# Patient Record
Sex: MALE | Race: WHITE | NOT HISPANIC OR LATINO | Employment: FULL TIME | URBAN - METROPOLITAN AREA
[De-identification: names, ages, dates, MRNs, and addresses within clinical notes are randomized per-mention and may not be internally consistent; named-entity substitution may affect disease eponyms.]

---

## 2017-11-01 ENCOUNTER — HOSPITAL ENCOUNTER (EMERGENCY)
Facility: HOSPITAL | Age: 62
Discharge: HOME | End: 2017-11-01
Attending: EMERGENCY MEDICINE
Payer: COMMERCIAL

## 2017-11-01 VITALS
SYSTOLIC BLOOD PRESSURE: 183 MMHG | TEMPERATURE: 98 F | RESPIRATION RATE: 18 BRPM | DIASTOLIC BLOOD PRESSURE: 77 MMHG | OXYGEN SATURATION: 97 % | HEART RATE: 85 BPM | HEIGHT: 67 IN | BODY MASS INDEX: 37.67 KG/M2 | WEIGHT: 240 LBS

## 2017-11-01 DIAGNOSIS — M25.531 RIGHT WRIST PAIN: ICD-10-CM

## 2017-11-01 DIAGNOSIS — S50.811A ABRASION OF RIGHT FOREARM, INITIAL ENCOUNTER: Primary | ICD-10-CM

## 2017-11-01 DIAGNOSIS — S59.911A INJURY OF RIGHT FOREARM, INITIAL ENCOUNTER: ICD-10-CM

## 2017-11-01 PROBLEM — S50.819A: Status: ACTIVE | Noted: 2017-11-01

## 2017-11-01 PROCEDURE — 99284 EMERGENCY DEPT VISIT MOD MDM: CPT

## 2017-11-01 PROCEDURE — 63600175 PHARM REV CODE 636 W HCPCS: Performed by: PHYSICIAN ASSISTANT

## 2017-11-01 PROCEDURE — 90715 TDAP VACCINE 7 YRS/> IM: CPT | Performed by: PHYSICIAN ASSISTANT

## 2017-11-01 PROCEDURE — 90471 IMMUNIZATION ADMIN: CPT | Performed by: PHYSICIAN ASSISTANT

## 2017-11-01 PROCEDURE — 25000003 PHARM REV CODE 250: Performed by: PHYSICIAN ASSISTANT

## 2017-11-01 RX ORDER — MUPIROCIN 20 MG/G
1 OINTMENT TOPICAL
Status: COMPLETED | OUTPATIENT
Start: 2017-11-01 | End: 2017-11-01

## 2017-11-01 RX ORDER — CEPHALEXIN 500 MG/1
500 CAPSULE ORAL EVERY 12 HOURS
Qty: 20 CAPSULE | Refills: 0 | Status: SHIPPED | OUTPATIENT
Start: 2017-11-01 | End: 2017-11-11

## 2017-11-01 RX ORDER — ACETAMINOPHEN 500 MG
500 TABLET ORAL
Status: COMPLETED | OUTPATIENT
Start: 2017-11-01 | End: 2017-11-01

## 2017-11-01 RX ORDER — MUPIROCIN 20 MG/G
OINTMENT TOPICAL 3 TIMES DAILY
Qty: 22 G | Refills: 0 | Status: SHIPPED | OUTPATIENT
Start: 2017-11-01 | End: 2017-11-08

## 2017-11-01 RX ORDER — ACETAMINOPHEN 500 MG
500 TABLET ORAL EVERY 6 HOURS PRN
Qty: 20 TABLET | Refills: 0 | Status: SHIPPED | OUTPATIENT
Start: 2017-11-01 | End: 2017-11-08

## 2017-11-01 RX ORDER — CEPHALEXIN 250 MG/1
500 CAPSULE ORAL
Status: COMPLETED | OUTPATIENT
Start: 2017-11-01 | End: 2017-11-01

## 2017-11-01 RX ADMIN — ACETAMINOPHEN 500 MG: 500 TABLET ORAL at 02:11

## 2017-11-01 RX ADMIN — CEPHALEXIN 500 MG: 250 CAPSULE ORAL at 02:11

## 2017-11-01 RX ADMIN — MUPIROCIN 22 G: 20 OINTMENT TOPICAL at 03:11

## 2017-11-01 RX ADMIN — CLOSTRIDIUM TETANI TOXOID ANTIGEN (FORMALDEHYDE INACTIVATED), CORYNEBACTERIUM DIPHTHERIAE TOXOID ANTIGEN (FORMALDEHYDE INACTIVATED), BORDETELLA PERTUSSIS TOXOID ANTIGEN (GLUTARALDEHYDE INACTIVATED), BORDETELLA PERTUSSIS FILAMENTOUS HEMAGGLUTININ ANTIGEN (FORMALDEHYDE INACTIVATED), BORDETELLA PERTUSSIS PERTACTIN ANTIGEN, AND BORDETELLA PERTUSSIS FIMBRIAE 2/3 ANTIGEN 0.5 ML: 5; 2; 2.5; 5; 3; 5 INJECTION, SUSPENSION INTRAMUSCULAR at 02:11

## 2017-11-01 NOTE — ED TRIAGE NOTES
Pt states he is an  moving an engine supports gave way and it landed on his right arm, sustained abrasions to the right arm and nose

## 2017-11-01 NOTE — DISCHARGE INSTRUCTIONS
Take full course of Keflex as prescribed to prevent infection of your wounds. Keep area clean, dry, apply Bactroban as prescribed and cover the area.     Follow up with primary care in 2 days.    Return to ER if you develop worsening symptoms, fever, worsening pain, redness, swelling, purulent drainage or as needed.

## 2017-11-01 NOTE — ED PROVIDER NOTES
Encounter Date: 11/1/2017    SCRIBE #1 NOTE: I, Gregorio Lamb, max scribing for, and in the presence of,  Syl Meraz PA-C. I have scribed the following portions of the note - Other sections scribed: HPI, ROS.       History     Chief Complaint   Patient presents with    Arm Injury     Was working on a car engine and it fell on his right arm.  States he can move his fingers     CC: Arm Injury    61 y/o male with Type II DM presents to the ED c/o acute onset lacerations to R forearm and tip of L sided nose that happened PTA. The pain is moderate (4/10). 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 The patient's tetanus is not UTD.The patient denies previous injury to his R forearm. The patient denies fever, back pain, neck pain, weakness, numbness/tingling, or N/V/D. No attempted treatment reported. No alleviating factors or other symptoms reported.      The history is provided by the patient. No  was used.     Review of patient's allergies indicates:  No Known Allergies  Past Medical History:   Diagnosis Date    Diabetes mellitus      Past Surgical History:   Procedure Laterality Date    APPENDECTOMY      VASECTOMY       History reviewed. No pertinent family history.  Social History   Substance Use Topics    Smoking status: Not on file    Smokeless tobacco: Not on file    Alcohol use Not on file     Review of Systems   Constitutional: Negative for chills and fever.   HENT: Negative for rhinorrhea.    Eyes: Negative for redness.   Respiratory: Negative for cough and shortness of breath.    Cardiovascular: Negative for chest pain.   Gastrointestinal: Negative for abdominal pain, diarrhea, nausea and vomiting.   Genitourinary: Negative for difficulty urinating and dysuria.   Musculoskeletal: Negative for back pain and neck pain.        (-) arm or leg trouble   Skin: Negative for rash.         (+) laceration to R forearm and tip of L sided nose   Neurological: Negative for weakness, numbness and headaches.       Physical Exam     Initial Vitals [11/01/17 1335]   BP Pulse Resp Temp SpO2   (!) 183/77 85 18 98.1 °F (36.7 °C) 97 %      MAP       112.33         Physical Exam    Nursing note and vitals reviewed.  Constitutional: He appears well-developed and well-nourished. No distress.   HENT:   Head: Normocephalic.   Small abrasion to tip of nose     Eyes: Conjunctivae are normal.   Cardiovascular: Normal rate and regular rhythm. Exam reveals no gallop and no friction rub.    No murmur heard.  Pulses:       Radial pulses are 2+ on the right side, and 2+ on the left side.   Pulmonary/Chest: Breath sounds normal. He has no wheezes. He has no rhonchi. He has no rales.   Musculoskeletal:   TTP to mid-shaft to distal radius. TTP over radial aspect of wrist with swelling   Neurological: He is alert. He has normal strength. No sensory deficit.   Skin: Skin is warm and dry.        Superficial abrasions to RUE (black 4 cm, yellow 2 cm, green 1 cm) and to tip of nose. Bleeding controlled         Psychiatric: He has a normal mood and affect.         ED Course   Procedures  Labs Reviewed - No data to display          Medical Decision Making:   Initial Assessment:   Pt is 62-year-old male who presents for evaluation of right forearm and right wrist pain and abrasions after dropping an aircraft engine on his right arm.  Patient is afebrile, well-appearing in no acute distress.  Patient's tetanus is not up to date.  Tetanus updated in ED.  Physical exam findings as detailed the above.  X-ray wrist was performed to include forearm and negative for any fractures or dislocations.  Wounds were cleaned and Bactroban applied. Does not appear to need lac repair. First dose of Keflex given. Patient is a diabetic so I will cover with Keflex as well to prevent infection.  PCP follow-up in 2 days.  I discussed this patient with   Mara who agrees with the assessment and plan.            Scribe Attestation:   Scribe #1: I performed the above scribed service and the documentation accurately describes the services I performed. I attest to the accuracy of the note.    Attending Attestation:           Physician Attestation for Scribe:  Physician Attestation Statement for Scribe #1: I, Syl Meraz PA-C, reviewed documentation, as scribed by Gregorio Lamb in my presence, and it is both accurate and complete.                 ED Course      Clinical Impression:   The primary encounter diagnosis was Abrasion of right forearm, initial encounter. Diagnoses of Right wrist pain and Injury of right forearm, initial encounter were also pertinent to this visit.                           Syl Beckham PA-C  11/01/17 7318

## 2018-11-19 ENCOUNTER — HOSPITAL ENCOUNTER (INPATIENT)
Facility: HOSPITAL | Age: 63
LOS: 2 days | Discharge: HOME | DRG: 378 | End: 2018-11-21
Attending: EMERGENCY MEDICINE | Admitting: HOSPITALIST
Payer: MEDICARE

## 2018-11-19 DIAGNOSIS — D61.818 PANCYTOPENIA: Primary | ICD-10-CM

## 2018-11-19 DIAGNOSIS — D64.9 SYMPTOMATIC ANEMIA: ICD-10-CM

## 2018-11-19 DIAGNOSIS — D64.9 ANEMIA: ICD-10-CM

## 2018-11-19 DIAGNOSIS — R42 DIZZINESS: ICD-10-CM

## 2018-11-19 PROBLEM — E11.9 TYPE 2 DIABETES MELLITUS: Chronic | Status: ACTIVE | Noted: 2018-11-19

## 2018-11-19 PROBLEM — E66.9 OBESITY, CLASS II, BMI 35-39.9: Chronic | Status: ACTIVE | Noted: 2018-11-19

## 2018-11-19 LAB
ABO + RH BLD: NORMAL
ALBUMIN SERPL BCP-MCNC: 3.3 G/DL
ALP SERPL-CCNC: 113 U/L
ALT SERPL W/O P-5'-P-CCNC: 26 U/L
AMPHET+METHAMPHET UR QL: NEGATIVE
ANION GAP SERPL CALC-SCNC: 10 MMOL/L
APTT BLDCRRT: 25.3 SEC
AST SERPL-CCNC: 30 U/L
BACTERIA #/AREA URNS HPF: NORMAL /HPF
BARBITURATES UR QL SCN>200 NG/ML: NEGATIVE
BASOPHILS # BLD AUTO: 0.02 K/UL
BASOPHILS NFR BLD: 0.8 %
BENZODIAZ UR QL SCN>200 NG/ML: NEGATIVE
BILIRUB SERPL-MCNC: 0.5 MG/DL
BILIRUB UR QL STRIP: NEGATIVE
BLD GP AB SCN CELLS X3 SERPL QL: NORMAL
BNP SERPL-MCNC: 115 PG/ML
BUN SERPL-MCNC: 36 MG/DL
BZE UR QL SCN: NEGATIVE
CALCIUM SERPL-MCNC: 8.4 MG/DL
CANNABINOIDS UR QL SCN: NEGATIVE
CHLORIDE SERPL-SCNC: 109 MMOL/L
CLARITY UR: CLEAR
CO2 SERPL-SCNC: 20 MMOL/L
COLOR UR: YELLOW
CREAT SERPL-MCNC: 2.1 MG/DL
CREAT UR-MCNC: 110.9 MG/DL
D DIMER PPP IA.FEU-MCNC: 1.26 MG/L FEU
DIFFERENTIAL METHOD: ABNORMAL
EOSINOPHIL # BLD AUTO: 0 K/UL
EOSINOPHIL NFR BLD: 1.5 %
ERYTHROCYTE [DISTWIDTH] IN BLOOD BY AUTOMATED COUNT: 15.4 %
EST. GFR  (AFRICAN AMERICAN): 37 ML/MIN/1.73 M^2
EST. GFR  (NON AFRICAN AMERICAN): 32 ML/MIN/1.73 M^2
ETHANOL SERPL-MCNC: <10 MG/DL
FLUAV AG SPEC QL IA: NEGATIVE
FLUBV AG SPEC QL IA: NEGATIVE
GLUCOSE SERPL-MCNC: 109 MG/DL
GLUCOSE SERPL-MCNC: 112 MG/DL (ref 70–110)
GLUCOSE UR QL STRIP: ABNORMAL
HCT VFR BLD AUTO: 20.1 %
HGB BLD-MCNC: 6.1 G/DL
HGB UR QL STRIP: NEGATIVE
INR PPP: 1
IRON SERPL-MCNC: 20 UG/DL
KETONES UR QL STRIP: NEGATIVE
LACTATE SERPL-SCNC: 1.6 MMOL/L
LACTATE SERPL-SCNC: 2.7 MMOL/L
LDH SERPL L TO P-CCNC: 199 U/L
LEUKOCYTE ESTERASE UR QL STRIP: NEGATIVE
LIPASE SERPL-CCNC: 106 U/L
LYMPHOCYTES # BLD AUTO: 0.3 K/UL
LYMPHOCYTES NFR BLD: 12.3 %
MAGNESIUM SERPL-MCNC: 2.1 MG/DL
MCH RBC QN AUTO: 28.5 PG
MCHC RBC AUTO-ENTMCNC: 30.3 G/DL
MCV RBC AUTO: 94 FL
METHADONE UR QL SCN>300 NG/ML: NEGATIVE
MICROSCOPIC COMMENT: NORMAL
MONOCYTES # BLD AUTO: 0.4 K/UL
MONOCYTES NFR BLD: 15.3 %
NEUTROPHILS # BLD AUTO: 1.8 K/UL
NEUTROPHILS NFR BLD: 70.1 %
NITRITE UR QL STRIP: NEGATIVE
OPIATES UR QL SCN: NORMAL
PCP UR QL SCN>25 NG/ML: NEGATIVE
PH UR STRIP: 5 [PH] (ref 5–8)
PLATELET # BLD AUTO: 66 K/UL
PMV BLD AUTO: 11.8 FL
POCT GLUCOSE: 112 MG/DL (ref 70–110)
POCT GLUCOSE: 221 MG/DL (ref 70–110)
POTASSIUM SERPL-SCNC: 4.5 MMOL/L
PROT SERPL-MCNC: 6.6 G/DL
PROT UR QL STRIP: NEGATIVE
PROTHROMBIN TIME: 10.9 SEC
RBC # BLD AUTO: 2.14 M/UL
RETICS/RBC NFR AUTO: 3.1 %
SATURATED IRON: 4 %
SODIUM SERPL-SCNC: 139 MMOL/L
SP GR UR STRIP: 1.01 (ref 1–1.03)
SPECIMEN SOURCE: NORMAL
TOTAL IRON BINDING CAPACITY: 518 UG/DL
TOXICOLOGY INFORMATION: NORMAL
TRANSFERRIN SERPL-MCNC: 350 MG/DL
TROPONIN I SERPL DL<=0.01 NG/ML-MCNC: <0.006 NG/ML
URATE SERPL-MCNC: 9.2 MG/DL
URN SPEC COLLECT METH UR: ABNORMAL
UROBILINOGEN UR STRIP-ACNC: NEGATIVE EU/DL
WBC # BLD AUTO: 2.61 K/UL
YEAST URNS QL MICRO: NORMAL

## 2018-11-19 PROCEDURE — 83540 ASSAY OF IRON: CPT

## 2018-11-19 PROCEDURE — 86901 BLOOD TYPING SEROLOGIC RH(D): CPT

## 2018-11-19 PROCEDURE — C9113 INJ PANTOPRAZOLE SODIUM, VIA: HCPCS | Performed by: EMERGENCY MEDICINE

## 2018-11-19 PROCEDURE — 85610 PROTHROMBIN TIME: CPT

## 2018-11-19 PROCEDURE — 87040 BLOOD CULTURE FOR BACTERIA: CPT | Mod: 59

## 2018-11-19 PROCEDURE — 83880 ASSAY OF NATRIURETIC PEPTIDE: CPT

## 2018-11-19 PROCEDURE — 96361 HYDRATE IV INFUSION ADD-ON: CPT

## 2018-11-19 PROCEDURE — 25000003 PHARM REV CODE 250: Performed by: EMERGENCY MEDICINE

## 2018-11-19 PROCEDURE — 87400 INFLUENZA A/B EACH AG IA: CPT | Mod: 59

## 2018-11-19 PROCEDURE — 21400001 HC TELEMETRY ROOM

## 2018-11-19 PROCEDURE — 83615 LACTATE (LD) (LDH) ENZYME: CPT

## 2018-11-19 PROCEDURE — 85379 FIBRIN DEGRADATION QUANT: CPT

## 2018-11-19 PROCEDURE — 82962 GLUCOSE BLOOD TEST: CPT

## 2018-11-19 PROCEDURE — 93010 ELECTROCARDIOGRAM REPORT: CPT | Mod: ,,, | Performed by: INTERNAL MEDICINE

## 2018-11-19 PROCEDURE — 83605 ASSAY OF LACTIC ACID: CPT | Mod: 91

## 2018-11-19 PROCEDURE — 80053 COMPREHEN METABOLIC PANEL: CPT

## 2018-11-19 PROCEDURE — P9021 RED BLOOD CELLS UNIT: HCPCS

## 2018-11-19 PROCEDURE — 36430 TRANSFUSION BLD/BLD COMPNT: CPT

## 2018-11-19 PROCEDURE — 85730 THROMBOPLASTIN TIME PARTIAL: CPT

## 2018-11-19 PROCEDURE — 85025 COMPLETE CBC W/AUTO DIFF WBC: CPT

## 2018-11-19 PROCEDURE — 96374 THER/PROPH/DIAG INJ IV PUSH: CPT

## 2018-11-19 PROCEDURE — 83735 ASSAY OF MAGNESIUM: CPT

## 2018-11-19 PROCEDURE — 93005 ELECTROCARDIOGRAM TRACING: CPT

## 2018-11-19 PROCEDURE — 86920 COMPATIBILITY TEST SPIN: CPT

## 2018-11-19 PROCEDURE — 80307 DRUG TEST PRSMV CHEM ANLYZR: CPT

## 2018-11-19 PROCEDURE — 85045 AUTOMATED RETICULOCYTE COUNT: CPT

## 2018-11-19 PROCEDURE — 81000 URINALYSIS NONAUTO W/SCOPE: CPT | Mod: 59

## 2018-11-19 PROCEDURE — 99285 EMERGENCY DEPT VISIT HI MDM: CPT | Mod: 25

## 2018-11-19 PROCEDURE — 84550 ASSAY OF BLOOD/URIC ACID: CPT

## 2018-11-19 PROCEDURE — 80320 DRUG SCREEN QUANTALCOHOLS: CPT

## 2018-11-19 PROCEDURE — 83690 ASSAY OF LIPASE: CPT

## 2018-11-19 PROCEDURE — 84484 ASSAY OF TROPONIN QUANT: CPT

## 2018-11-19 PROCEDURE — 63600175 PHARM REV CODE 636 W HCPCS: Performed by: EMERGENCY MEDICINE

## 2018-11-19 RX ORDER — IBUPROFEN 200 MG
24 TABLET ORAL
Status: DISCONTINUED | OUTPATIENT
Start: 2018-11-20 | End: 2018-11-21 | Stop reason: HOSPADM

## 2018-11-19 RX ORDER — HYDROCODONE BITARTRATE AND ACETAMINOPHEN 500; 5 MG/1; MG/1
TABLET ORAL
Status: DISCONTINUED | OUTPATIENT
Start: 2018-11-19 | End: 2018-11-21 | Stop reason: HOSPADM

## 2018-11-19 RX ORDER — GLUCAGON 1 MG
1 KIT INJECTION
Status: DISCONTINUED | OUTPATIENT
Start: 2018-11-20 | End: 2018-11-21 | Stop reason: HOSPADM

## 2018-11-19 RX ORDER — ACETAMINOPHEN 500 MG
500 TABLET ORAL EVERY 6 HOURS PRN
Status: DISCONTINUED | OUTPATIENT
Start: 2018-11-20 | End: 2018-11-21 | Stop reason: HOSPADM

## 2018-11-19 RX ORDER — INSULIN ASPART 100 [IU]/ML
0-5 INJECTION, SOLUTION INTRAVENOUS; SUBCUTANEOUS
Status: DISCONTINUED | OUTPATIENT
Start: 2018-11-20 | End: 2018-11-21 | Stop reason: HOSPADM

## 2018-11-19 RX ORDER — RAMELTEON 8 MG/1
8 TABLET ORAL NIGHTLY PRN
Status: DISCONTINUED | OUTPATIENT
Start: 2018-11-20 | End: 2018-11-21 | Stop reason: HOSPADM

## 2018-11-19 RX ORDER — PANTOPRAZOLE SODIUM 40 MG/10ML
80 INJECTION, POWDER, LYOPHILIZED, FOR SOLUTION INTRAVENOUS
Status: COMPLETED | OUTPATIENT
Start: 2018-11-19 | End: 2018-11-19

## 2018-11-19 RX ORDER — IBUPROFEN 200 MG
16 TABLET ORAL
Status: DISCONTINUED | OUTPATIENT
Start: 2018-11-20 | End: 2018-11-21 | Stop reason: HOSPADM

## 2018-11-19 RX ORDER — INSULIN ASPART 100 [IU]/ML
3 INJECTION, SOLUTION INTRAVENOUS; SUBCUTANEOUS
Status: DISCONTINUED | OUTPATIENT
Start: 2018-11-20 | End: 2018-11-21 | Stop reason: HOSPADM

## 2018-11-19 RX ORDER — ONDANSETRON 2 MG/ML
8 INJECTION INTRAMUSCULAR; INTRAVENOUS EVERY 8 HOURS PRN
Status: DISCONTINUED | OUTPATIENT
Start: 2018-11-20 | End: 2018-11-21 | Stop reason: HOSPADM

## 2018-11-19 RX ORDER — ACETAMINOPHEN 500 MG
1000 TABLET ORAL
Status: COMPLETED | OUTPATIENT
Start: 2018-11-19 | End: 2018-11-19

## 2018-11-19 RX ADMIN — SODIUM CHLORIDE 1000 ML: 9 INJECTION, SOLUTION INTRAVENOUS at 04:11

## 2018-11-19 RX ADMIN — SODIUM CHLORIDE: 0.9 INJECTION, SOLUTION INTRAVENOUS at 09:11

## 2018-11-19 RX ADMIN — ACETAMINOPHEN 1000 MG: 500 TABLET, FILM COATED ORAL at 02:11

## 2018-11-19 RX ADMIN — PANTOPRAZOLE SODIUM 80 MG: 40 INJECTION, POWDER, FOR SOLUTION INTRAVENOUS at 05:11

## 2018-11-19 RX ADMIN — SODIUM CHLORIDE 1000 ML: 9 INJECTION, SOLUTION INTRAVENOUS at 02:11

## 2018-11-19 NOTE — ED TRIAGE NOTES
Pt reports he came home from [facility] last week and feels like he has not caught up on his sleep. Pt has felt dizzy and off for week. Pt reports these symptoms have not stopped and he was told today he looked pale.

## 2018-11-19 NOTE — ED PROVIDER NOTES
"Encounter Date: 11/19/2018    SCRIBE #1 NOTE: I, Mason Maida, am scribing for, and in the presence of,  Promise Bennett MD. I have scribed the following portions of the note - Other sections scribed: HPI, ROS and PE.       History     Chief Complaint   Patient presents with    Dizziness     present to the ER with c/o " dizziness, blurry vision, cotton mouth, hard to breath if i start moving around" symptoms since " a few hours" Hx: DM     CC: Dizziness    HPI: This is a 63 y.o. male PMHx DM who presents with dizziness like feeling off-balance/weak/tired/dry mouth that began today at approximately 09:00. Also reports dry mouth. 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 He has not checked his CBG; last read was yesterday evening (180 mg/dL). He did recently travel to the [facility] (returned 1 week ago) but does not report any known mosquito bites/no swimming in rivers/stayed in the city. Denies falls, fever, cough, headaches. He took Tylenol last night but has not taken anything today. Denies dark or bloody stools. No history of heart problems reported. He did get his flu shot this year. Of note, he quit smoking 1 month ago and was recently dx'ed with bronchitis. No further symptoms.         The history is provided by the patient. No  was used.     Review of patient's allergies indicates:  No Known Allergies  Past Medical History:   Diagnosis Date    Diabetes mellitus      Past Surgical History:   Procedure Laterality Date    APPENDECTOMY      VASECTOMY       No family history on file.  Social History     Tobacco Use    Smoking status: Not on file   Substance Use Topics    Alcohol use: Not on file    Drug use: Not on file     Review of Systems   Constitutional: Negative.  Negative for chills, diaphoresis and fever.   HENT: Negative.  Negative for rhinorrhea and sore throat.    Eyes: Negative.  Negative for visual disturbance.   Respiratory: Negative.  " Negative for cough and shortness of breath.    Cardiovascular: Negative.  Negative for chest pain.   Gastrointestinal: Negative for abdominal pain, constipation, diarrhea, nausea and vomiting.   Endocrine:        Dry mouth   Genitourinary: Negative.  Negative for dysuria.   Musculoskeletal: Negative.    Allergic/Immunologic: Negative.    Neurological: Positive for dizziness. Negative for headaches.   Hematological: Negative.    Psychiatric/Behavioral: Negative.    All other systems reviewed and are negative.      Physical Exam     Initial Vitals [11/19/18 1415]   BP Pulse Resp Temp SpO2   127/60 80 20 99.1 °F (37.3 °C) 97 %      MAP       --         Physical Exam    Nursing note and vitals reviewed.  Constitutional: He appears well-developed and well-nourished. He is not diaphoretic. No distress.   HENT:   Head: Normocephalic and atraumatic.   Mouth/Throat: Oropharynx is clear and moist.   Eyes: Conjunctivae and EOM are normal. Pupils are equal, round, and reactive to light. No scleral icterus.   Neck: Normal range of motion. Neck supple. No JVD present.   Cardiovascular: Normal rate, regular rhythm and intact distal pulses.   Pulmonary/Chest: Breath sounds normal. No stridor. No respiratory distress.   Abdominal: Soft. Bowel sounds are normal. He exhibits no distension. There is no tenderness.   No ttp   Musculoskeletal: Normal range of motion. He exhibits no edema or tenderness.   Chronically mildly edematous ble w venous stasis; no oozing calf ttp or ulcer   Neurological: He is alert and oriented to person, place, and time. He has normal strength. No cranial nerve deficit.   Normal finger-nose.  Normal heel-to-shin.  Cranial nerves are intact. Normal strength all extremities. Normal voice.     Skin: Skin is warm and dry. No rash noted.   Psychiatric: He has a normal mood and affect.         ED Course   Procedures  Labs Reviewed   CBC W/ AUTO DIFFERENTIAL   COMPREHENSIVE METABOLIC PANEL   TROPONIN I   MAGNESIUM    POCT GLUCOSE MONITORING CONTINUOUS     EKG Readings: (Independently Interpreted)   Rhythm: Normal Sinus Rhythm. Ectopy: No Ectopy. Conduction: Normal. ST Segments: Normal ST Segments. T Waves: Normal. Clinical Impression: Normal Sinus Rhythm   nsr no stemi ; no old ekf for comparison; nl intervals       Imaging Results    None                     Scribe Attestation:   Scribe #1: I performed the above scribed service and the documentation accurately describes the services I performed. I attest to the accuracy of the note.    Attending Attestation:   Physician Attestation Statement for Resident:  As the supervising MD . -: Patient patient reports that since about 9:00 a.m. he has been having some element of dry mouth lightheaded desire to lay down.  There is no focal neurological complaints.  There is no focal neurological deficit on exam.  Normal finger-nose.  Normal heel-to-shin.  Cranial nerves are intact. Normal strength all extremities. Normal voice.  Patient blood pressure around 130s over 60s.  Previous visit within the past few weeks he has about 180s over 77.  Oral temp is 100°.  Will evaluate this patient and likely admit.  No STEMI on EKG.         Physician Attestation for Scribe:  Physician Attestation Statement for Scribe #1: I, Promise Bennett MD, reviewed documentation, as scribed by Mason Bey in my presence, and it is both accurate and complete.         Attending ED Notes:   Patient now reporting to RN that he was having some shortness of breath.  We will add a D-dimer.      Patient hemoglobin 6.  White blood cell count 2.6.  He is not neutropenic.  We will do rectal exam.  BUN creatinine 36 and 2.1.  Lactate slightly elevated.  Less likely due to sepsis.  D-dimer is elevated.  Patient does not report any shortness of breath to me.  Less likely pulmonary embolism.  Patient with some renal failure.  He does not report any chest pain shortness of breath to me.  Symptoms more likely due to anemia  pancytopenia.  We will transfuse this patient.  Protonix bolus and drip.  Will call GI.  I will admit this patient.  Lipase is elevated.  We will do bedside ultrasound.    Rectal exam shows brown stool guaiac negative. Rectal temp is 98.3°.  Ultrasound will be done bedside.  Patient consenting to transfusion.  I will talk to admitting physician.  Patient appears to be pancytopenic.  I will ask admitting physician if they would like me to continue Protonix and call GI.  He has never had endoscopy before.  He has  had colonoscopy 8-10 y ago    I spoke with Dr. raygoza.  Requests holding Protonix infusion and antibiotics. Less likely sepsis or pe more likely pancytopenai We will consult Hematology.  I have added iron and TIBC.  D-dimer likely elevated secondary to hematologic process and less likely pulmonary embolism. He req hold gi consult             Clinical Impression:   The encounter diagnosis was Dizziness.                             Promise Bennett MD  11/19/18 1455       Promise Bennett MD  11/19/18 1459       Promise Bennett MD  11/19/18 1551       Promise Bennett MD  11/19/18 1601       Promise Bennett MD  11/19/18 1601       Promise Bennett MD  11/19/18 1640       Promise Bennett MD  11/19/18 1715

## 2018-11-20 ENCOUNTER — ANESTHESIA EVENT (OUTPATIENT)
Dept: ENDOSCOPY | Facility: HOSPITAL | Age: 63
DRG: 378 | End: 2018-11-20
Payer: MEDICARE

## 2018-11-20 ENCOUNTER — ANESTHESIA (OUTPATIENT)
Dept: ENDOSCOPY | Facility: HOSPITAL | Age: 63
DRG: 378 | End: 2018-11-20
Payer: MEDICARE

## 2018-11-20 PROBLEM — D61.818 PANCYTOPENIA: Status: ACTIVE | Noted: 2018-11-19

## 2018-11-20 PROBLEM — N17.9 ARF (ACUTE RENAL FAILURE): Status: ACTIVE | Noted: 2018-11-20

## 2018-11-20 PROBLEM — E11.9 TYPE 2 DIABETES MELLITUS: Status: ACTIVE | Noted: 2018-11-19

## 2018-11-20 PROBLEM — E11.29 DM (DIABETES MELLITUS) TYPE II CONTROLLED WITH RENAL MANIFESTATION: Status: ACTIVE | Noted: 2018-11-19

## 2018-11-20 LAB
ALBUMIN SERPL BCP-MCNC: 3.3 G/DL
ALP SERPL-CCNC: 117 U/L
ALT SERPL W/O P-5'-P-CCNC: 29 U/L
ANION GAP SERPL CALC-SCNC: 9 MMOL/L
AST SERPL-CCNC: 29 U/L
BASOPHILS # BLD AUTO: 0.01 K/UL
BASOPHILS NFR BLD: 0.3 %
BILIRUB SERPL-MCNC: 2.2 MG/DL
BLD PROD TYP BPU: NORMAL
BLD PROD TYP BPU: NORMAL
BLOOD UNIT EXPIRATION DATE: NORMAL
BLOOD UNIT EXPIRATION DATE: NORMAL
BLOOD UNIT TYPE CODE: 6200
BLOOD UNIT TYPE CODE: 6200
BLOOD UNIT TYPE: NORMAL
BLOOD UNIT TYPE: NORMAL
BUN SERPL-MCNC: 30 MG/DL
CALCIUM SERPL-MCNC: 8.4 MG/DL
CHLORIDE SERPL-SCNC: 111 MMOL/L
CO2 SERPL-SCNC: 19 MMOL/L
CODING SYSTEM: NORMAL
CODING SYSTEM: NORMAL
CREAT SERPL-MCNC: 1.6 MG/DL
DIFFERENTIAL METHOD: ABNORMAL
DISPENSE STATUS: NORMAL
DISPENSE STATUS: NORMAL
EOSINOPHIL # BLD AUTO: 0.1 K/UL
EOSINOPHIL NFR BLD: 3.7 %
ERYTHROCYTE [DISTWIDTH] IN BLOOD BY AUTOMATED COUNT: 15.2 %
EST. GFR  (AFRICAN AMERICAN): 51 ML/MIN/1.73 M^2
EST. GFR  (NON AFRICAN AMERICAN): 45 ML/MIN/1.73 M^2
ESTIMATED AVG GLUCOSE: 154 MG/DL
GLUCOSE SERPL-MCNC: 125 MG/DL
HBA1C MFR BLD HPLC: 7 %
HCT VFR BLD AUTO: 26 %
HGB BLD-MCNC: 8.3 G/DL
HIV1+2 IGG SERPL QL IA.RAPID: NEGATIVE
LYMPHOCYTES # BLD AUTO: 0.4 K/UL
LYMPHOCYTES NFR BLD: 14.4 %
MAGNESIUM SERPL-MCNC: 2.2 MG/DL
MCH RBC QN AUTO: 29.6 PG
MCHC RBC AUTO-ENTMCNC: 31.9 G/DL
MCV RBC AUTO: 93 FL
MONOCYTES # BLD AUTO: 0.3 K/UL
MONOCYTES NFR BLD: 10.4 %
NEUTROPHILS # BLD AUTO: 2.1 K/UL
NEUTROPHILS NFR BLD: 71.2 %
PHOSPHATE SERPL-MCNC: 3.6 MG/DL
PLATELET # BLD AUTO: 55 K/UL
PMV BLD AUTO: 11.6 FL
POCT GLUCOSE: 138 MG/DL (ref 70–110)
POCT GLUCOSE: 203 MG/DL (ref 70–110)
POCT GLUCOSE: 210 MG/DL (ref 70–110)
POTASSIUM SERPL-SCNC: 5.2 MMOL/L
PROT SERPL-MCNC: 6.9 G/DL
RBC # BLD AUTO: 2.8 M/UL
SODIUM SERPL-SCNC: 139 MMOL/L
TRANS ERYTHROCYTES VOL PATIENT: NORMAL ML
TRANS ERYTHROCYTES VOL PATIENT: NORMAL ML
WBC # BLD AUTO: 2.98 K/UL

## 2018-11-20 PROCEDURE — 25000003 PHARM REV CODE 250: Performed by: INTERNAL MEDICINE

## 2018-11-20 PROCEDURE — 43244 EGD VARICES LIGATION: CPT | Performed by: INTERNAL MEDICINE

## 2018-11-20 PROCEDURE — 88305 TISSUE EXAM BY PATHOLOGIST: CPT | Performed by: PATHOLOGY

## 2018-11-20 PROCEDURE — 37000008 HC ANESTHESIA 1ST 15 MINUTES: Performed by: INTERNAL MEDICINE

## 2018-11-20 PROCEDURE — 84100 ASSAY OF PHOSPHORUS: CPT

## 2018-11-20 PROCEDURE — 80053 COMPREHEN METABOLIC PANEL: CPT

## 2018-11-20 PROCEDURE — 83735 ASSAY OF MAGNESIUM: CPT

## 2018-11-20 PROCEDURE — 25000003 PHARM REV CODE 250: Performed by: ANESTHESIOLOGY

## 2018-11-20 PROCEDURE — 36415 COLL VENOUS BLD VENIPUNCTURE: CPT

## 2018-11-20 PROCEDURE — 63600175 PHARM REV CODE 636 W HCPCS: Performed by: INTERNAL MEDICINE

## 2018-11-20 PROCEDURE — 83036 HEMOGLOBIN GLYCOSYLATED A1C: CPT

## 2018-11-20 PROCEDURE — S5571 INSULIN DISPOS PEN 3 ML: HCPCS | Performed by: INTERNAL MEDICINE

## 2018-11-20 PROCEDURE — 80074 ACUTE HEPATITIS PANEL: CPT

## 2018-11-20 PROCEDURE — 63600175 PHARM REV CODE 636 W HCPCS: Performed by: NURSE ANESTHETIST, CERTIFIED REGISTERED

## 2018-11-20 PROCEDURE — 85025 COMPLETE CBC W/AUTO DIFF WBC: CPT

## 2018-11-20 PROCEDURE — 37000009 HC ANESTHESIA EA ADD 15 MINS: Performed by: INTERNAL MEDICINE

## 2018-11-20 PROCEDURE — D9220A PRA ANESTHESIA: Mod: CRNA,,, | Performed by: NURSE ANESTHETIST, CERTIFIED REGISTERED

## 2018-11-20 PROCEDURE — 43239 EGD BIOPSY SINGLE/MULTIPLE: CPT | Performed by: INTERNAL MEDICINE

## 2018-11-20 PROCEDURE — 27201012 HC FORCEPS, HOT/COLD, DISP: Performed by: INTERNAL MEDICINE

## 2018-11-20 PROCEDURE — 25000003 PHARM REV CODE 250: Performed by: HOSPITALIST

## 2018-11-20 PROCEDURE — 0DB98ZX EXCISION OF DUODENUM, VIA NATURAL OR ARTIFICIAL OPENING ENDOSCOPIC, DIAGNOSTIC: ICD-10-PCS | Performed by: INTERNAL MEDICINE

## 2018-11-20 PROCEDURE — 21400001 HC TELEMETRY ROOM

## 2018-11-20 PROCEDURE — 27201022: Performed by: INTERNAL MEDICINE

## 2018-11-20 PROCEDURE — P9021 RED BLOOD CELLS UNIT: HCPCS

## 2018-11-20 PROCEDURE — 06L38CZ OCCLUSION OF ESOPHAGEAL VEIN WITH EXTRALUMINAL DEVICE, VIA NATURAL OR ARTIFICIAL OPENING ENDOSCOPIC: ICD-10-PCS | Performed by: INTERNAL MEDICINE

## 2018-11-20 PROCEDURE — 0DB68ZX EXCISION OF STOMACH, VIA NATURAL OR ARTIFICIAL OPENING ENDOSCOPIC, DIAGNOSTIC: ICD-10-PCS | Performed by: INTERNAL MEDICINE

## 2018-11-20 PROCEDURE — 88305 TISSUE EXAM BY PATHOLOGIST: CPT | Mod: 26,,, | Performed by: PATHOLOGY

## 2018-11-20 PROCEDURE — 86703 HIV-1/HIV-2 1 RESULT ANTBDY: CPT

## 2018-11-20 PROCEDURE — D9220A PRA ANESTHESIA: Mod: ANES,,, | Performed by: ANESTHESIOLOGY

## 2018-11-20 PROCEDURE — 36430 TRANSFUSION BLD/BLD COMPNT: CPT

## 2018-11-20 RX ORDER — INSULIN LISPRO 100 [IU]/ML
32 INJECTION, SOLUTION INTRAVENOUS; SUBCUTANEOUS 2 TIMES DAILY
Status: ON HOLD | COMMUNITY
End: 2018-11-21 | Stop reason: SDUPTHER

## 2018-11-20 RX ORDER — SODIUM CHLORIDE 0.9 % (FLUSH) 0.9 %
3 SYRINGE (ML) INJECTION
Status: DISCONTINUED | OUTPATIENT
Start: 2018-11-20 | End: 2018-11-20 | Stop reason: HOSPADM

## 2018-11-20 RX ORDER — EMPAGLIFLOZIN AND METFORMIN HYDROCHLORIDE 12.5; 1 MG/1; MG/1
2 TABLET ORAL 2 TIMES DAILY
COMMUNITY

## 2018-11-20 RX ORDER — PROPOFOL 10 MG/ML
VIAL (ML) INTRAVENOUS
Status: DISPENSED
Start: 2018-11-20 | End: 2018-11-21

## 2018-11-20 RX ORDER — PROPOFOL 10 MG/ML
VIAL (ML) INTRAVENOUS
Status: DISCONTINUED | OUTPATIENT
Start: 2018-11-20 | End: 2018-11-20

## 2018-11-20 RX ORDER — LIDOCAINE HYDROCHLORIDE 20 MG/ML
INJECTION, SOLUTION INFILTRATION; PERINEURAL
Status: DISPENSED
Start: 2018-11-20 | End: 2018-11-21

## 2018-11-20 RX ORDER — SODIUM CHLORIDE 9 MG/ML
INJECTION, SOLUTION INTRAVENOUS CONTINUOUS
Status: DISCONTINUED | OUTPATIENT
Start: 2018-11-20 | End: 2018-11-21 | Stop reason: HOSPADM

## 2018-11-20 RX ORDER — INSULIN GLARGINE 100 [IU]/ML
80 INJECTION, SOLUTION SUBCUTANEOUS NIGHTLY
Status: ON HOLD | COMMUNITY
End: 2018-11-21 | Stop reason: SDUPTHER

## 2018-11-20 RX ORDER — LIDOCAINE HCL/PF 100 MG/5ML
SYRINGE (ML) INTRAVENOUS
Status: DISCONTINUED | OUTPATIENT
Start: 2018-11-20 | End: 2018-11-20

## 2018-11-20 RX ORDER — TELMISARTAN AND HYDROCHLORTHIAZIDE 80; 25 MG/1; MG/1
1 TABLET ORAL DAILY
Status: ON HOLD | COMMUNITY
End: 2018-11-21 | Stop reason: HOSPADM

## 2018-11-20 RX ORDER — ATORVASTATIN CALCIUM 10 MG/1
10 TABLET, FILM COATED ORAL DAILY
COMMUNITY

## 2018-11-20 RX ORDER — SODIUM CHLORIDE 450 MG/100ML
INJECTION, SOLUTION INTRAVENOUS CONTINUOUS
Status: DISCONTINUED | OUTPATIENT
Start: 2018-11-20 | End: 2018-11-21 | Stop reason: HOSPADM

## 2018-11-20 RX ADMIN — PROPOFOL 20 MG: 10 INJECTION, EMULSION INTRAVENOUS at 12:11

## 2018-11-20 RX ADMIN — SODIUM CHLORIDE: 0.45 INJECTION, SOLUTION INTRAVENOUS at 01:11

## 2018-11-20 RX ADMIN — PROPOFOL 40 MG: 10 INJECTION, EMULSION INTRAVENOUS at 12:11

## 2018-11-20 RX ADMIN — INSULIN DETEMIR 10 UNITS: 100 INJECTION, SOLUTION SUBCUTANEOUS at 09:11

## 2018-11-20 RX ADMIN — INSULIN ASPART 3 UNITS: 100 INJECTION, SOLUTION INTRAVENOUS; SUBCUTANEOUS at 05:11

## 2018-11-20 RX ADMIN — CEFTRIAXONE 1 G: 1 INJECTION, SOLUTION INTRAVENOUS at 01:11

## 2018-11-20 RX ADMIN — INSULIN ASPART 2 UNITS: 100 INJECTION, SOLUTION INTRAVENOUS; SUBCUTANEOUS at 05:11

## 2018-11-20 RX ADMIN — OCTREOTIDE ACETATE 50 MCG/HR: 200 INJECTION, SOLUTION INTRAVENOUS; SUBCUTANEOUS at 03:11

## 2018-11-20 RX ADMIN — SODIUM CHLORIDE: 0.9 INJECTION, SOLUTION INTRAVENOUS at 11:11

## 2018-11-20 RX ADMIN — PROPOFOL 80 MG: 10 INJECTION, EMULSION INTRAVENOUS at 12:11

## 2018-11-20 RX ADMIN — LIDOCAINE HYDROCHLORIDE 100 MG: 20 INJECTION, SOLUTION INTRAVENOUS at 12:11

## 2018-11-20 RX ADMIN — INSULIN ASPART 1 UNITS: 100 INJECTION, SOLUTION INTRAVENOUS; SUBCUTANEOUS at 09:11

## 2018-11-20 NOTE — PROGRESS NOTES
[facility] Hospital Medicine  Progress Note    Patient Name: Leon Lee  MRN: [id number]  Patient Class: IP- Inpatient   Admission Date: 11/19/2018  Length of Stay: 1 days  Attending Physician: Kellie Peres MD  Primary Care Provider: Cale Ortiz MD        Subjective:     Principal Problem:Pancytopenia    HPI:  Mr. Leon Lee is a 63 y.o. male with type 2 diabetes mellitus (HbA1c unknown) and obesity (BMI 37.5) who presents to [facility] ED with complaints of weakness today.  He also had some dry mouth.  The weakness has been present for the past week or so but has gotten significant worse today while at work.  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  He also felt lightheaded on postural changes but denies any falls or loss of consciousness.  He denies any shortness of breath, chest pains, nor any palpitations.  He did feel similar symptoms for two days toward the end of last week but he just tried to ignore it.  He does say that he spent 9 days in the [facility] with his wife for an emergency and returned here about a week ago.  He has been jetlagged and says he just can't get back into the rhythm of things.  His appetite has been good and he has been compliant with his medications.  He denies any changes to his medications.    Of note, he was found to be anemic in the ED of which he denies a history.  He denies any hematochezia, melena, hematuria, hemoptysis, hematemesis, nor any coffee-ground emesis.  He denies any NSAID use and did have a colonoscopy at [facility] greater than 10 years ago which he says is clear.      Hospital Course:  Mr. Leon Lee is a 63 y.o. male with type 2 diabetes mellitus (HbA1c unknown) and obesity (BMI 37.5) who presents to [facility] ED with complaints of weakness.  He also had some dry mouth.  The weakness has been present for the past  week or so but has gotten significant worse today while at work.  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  He also felt lightheaded on postural changes but denies any falls or loss of consciousness.  He denies any shortness of breath, chest pains, nor any palpitations.  He did feel similar symptoms for two days toward the end of last week but he just tried to ignore it.  He does say that he spent 9 days in the [facility] with his wife for an emergency and returned here about a week ago.  He has been jetlagged and says he just can't get back into the rhythm of things.  His appetite has been good and he has been compliant with his medications.  He denies any changes to his medications.  Of note, he was found to be anemic in the ED of which he denies a history.  He denies any hematochezia, melena, hematuria, hemoptysis, hematemesis, nor any coffee-ground emesis.  He denies any NSAID use and did have a colonoscopy at [facility] greater than 10 years ago which he says is clear.    Patient is pancytopenic,S/P 2 PRBC with improvement in HH,,  GI has been consulted and planing doing EGD.  Will check hepatitis panel and rapid HIV test.consult hematology.  Started on IVF for ARF.      Past Medical History:   Diagnosis Date    Anemia 11/19/2018    Bronchitis     October/November 2018    Cigarette smoker 11/19/2018    reports quiting in early October, 2018    Depression 11/19/2018    grandson recently killed in motorcycle accident    Diabetes mellitus     Hypertension     Obese        Past Surgical History:   Procedure Laterality Date    APPENDECTOMY      VASECTOMY         Review of patient's allergies indicates:   Allergen Reactions    Trulicity [dulaglutide] Itching       No current facility-administered medications on file prior to encounter.      Current Outpatient Medications on File Prior to  Encounter   Medication Sig    UNKNOWN TO PATIENT      Family History     None        Tobacco Use    Smoking status: Former Smoker     Types: Cigarettes     Last attempt to quit: 10/19/2018     Years since quittin.0   Substance and Sexual Activity    Alcohol use: No     Frequency: Never    Drug use: No    Sexual activity: Not on file     Review of Systems   Constitutional: Positive for activity change and fatigue. Negative for appetite change, chills, diaphoresis, fever and unexpected weight change.   HENT: Negative.    Eyes: Negative.    Respiratory: Negative for cough, chest tightness, shortness of breath and wheezing.    Cardiovascular: Positive for leg swelling. Negative for chest pain and palpitations.   Gastrointestinal: Negative for abdominal distention, abdominal pain, blood in stool, constipation, diarrhea, nausea and vomiting.   Genitourinary: Negative for dysuria and hematuria.   Musculoskeletal: Negative.    Skin: Negative.    Neurological: Positive for weakness and light-headedness. Negative for dizziness, seizures and syncope.   Psychiatric/Behavioral: Negative.      Objective:     Vital Signs (Most Recent):  Temp: 98.4 °F (36.9 °C) (18 07)  Pulse: 73 (18 07)  Resp: 20 (18)  BP: (!) 140/65 (18)  SpO2: 96 % (18) Vital Signs (24h Range):  Temp:  [97.7 °F (36.5 °C)-99.6 °F (37.6 °C)] 98.4 °F (36.9 °C)  Pulse:  [66-82] 73  Resp:  [16-25] 20  SpO2:  [95 %-98 %] 96 %  BP: (113-140)/(54-65) 140/65     Weight: 108.3 kg (238 lb 12.1 oz)  Body mass index is 37.39 kg/m².    Physical Exam   Constitutional: He is oriented to person, place, and time. He appears well-developed and well-nourished. No distress.   HENT:   Head: Normocephalic and atraumatic.   Right Ear: External ear normal.   Left Ear: External ear normal.   Nose: Nose normal.   Eyes: Right eye exhibits no discharge. Left eye exhibits no discharge.   Neck: Normal range of motion.   Cardiovascular:    Regular rate and rhythm with a Grade II/VI early systolic murmur, no gallops   Pulmonary/Chest: Effort normal and breath sounds normal. No stridor. No respiratory distress. He has no wheezes. He has no rales. He exhibits no tenderness.   Abdominal: Soft. Bowel sounds are normal. He exhibits no distension. There is no tenderness. There is no rebound and no guarding.   Musculoskeletal: Normal range of motion. He exhibits no edema.   Neurological: He is alert and oriented to person, place, and time.   Skin: Skin is warm and dry. He is not diaphoretic. No erythema.   Psychiatric: He has a normal mood and affect. His behavior is normal. Judgment and thought content normal.   Nursing note and vitals reviewed.          Significant Labs: All pertinent labs within the past 24 hours have been reviewed.    Significant Imaging: I have reviewed and interpreted all pertinent imaging results/findings within the past 24 hours.    Assessment/Plan:      * Pancytopenia    We do not have previous labwork to which to compare the chronicity of his anemia but given his acute onset symptoms, I'm assuming this is new.  He has had recent travel but it doesn't appear to be related given that he has a relatively normal differential on his CBC and no increased LDH or total bilirubin.  An anemia panel reveals low iron and high TIBC which suggests iron deficiency but he has a normal MCV.  Patient is pancytopenic,S/P 2 PRBC with improvement in HH,,  GI has been consulted and planing doing EGD.  Will check hepatitis panel and rapid HIV test.consult hematology.       ARF (acute renal failure)    Started on IVF,not sure how acute is.       Obesity, Class II, BMI 35-39.9    The patient has been counseled on the negative impact that obesity imparts on his health and was encouraged to make lifestyle changes in order to lose weight and decrease his modifiable risk factors.     DM (diabetes mellitus) type II controlled with renal manifestation     Unclear what medications he takes but will provide basal-prandial insulin therapy along with insulin sliding scale.       VTE Risk Mitigation (From admission, onward)    None              Kellie Peres MD  Department of Hospital Medicine   [facility]

## 2018-11-20 NOTE — PROGRESS NOTES
Report received from off going nurse, CHARITY Rainey. Patient AAO. No signs of distress noted. Call light in reach. Bed low and locked. Will continue to monitor.
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
## 2018-11-20 NOTE — HPI
Mr. Leon Lee is a 63 y.o. male with type 2 diabetes mellitus (HbA1c unknown) and obesity (BMI 37.5) who presents to [facility] ED with complaints of weakness today.  He also had some dry mouth.  The weakness has been present for the past week or so but has gotten significant worse today while at work.  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  He also felt lightheaded on postural changes but denies any falls or loss of consciousness.  He denies any shortness of breath, chest pains, nor any palpitations.  He did feel similar symptoms for two days toward the end of last week but he just tried to ignore it.  He does say that he spent 9 days in the [facility] with his wife for an emergency and returned here about a week ago.  He has been jetlagged and says he just can't get back into the rhythm of things.  His appetite has been good and he has been compliant with his medications.  He denies any changes to his medications.    Of note, he was found to be anemic in the ED of which he denies a history.  He denies any hematochezia, melena, hematuria, hemoptysis, hematemesis, nor any coffee-ground emesis.  He denies any NSAID use and did have a colonoscopy at [facility] greater than 10 years ago which he says is clear.

## 2018-11-20 NOTE — PLAN OF CARE
Problem: Diabetes, Type 2 (Adult)  Intervention: Optimize Glycemic Control   11/20/18 0057   Nutrition Interventions   Glycemic Management blood glucose monitoring

## 2018-11-20 NOTE — PROGRESS NOTES
DINORAH contacted [facility] @ [phone] to schedule GI follow-up, DINORAH spoke to Ondina. Patient will see Dr. Martinez on 12/17/18 @ 8:45am. DINORAH contacted Dr. Ortiz office @ [phone] to schedule PCP follow-up, DINORAH spoke to Aline, appointment scheduled for 11/26/18 @ 8:45am.

## 2018-11-20 NOTE — PLAN OF CARE
Problem: Fall Risk (Adult)  Intervention: Safety Promotion/Fall Prevention   11/19/18 4570   Safety Interventions   Safety Promotion/Fall Prevention assistive device/personal item within reach;bed alarm set;diversional activities provided;Fall Risk reviewed with patient/family;lighting adjusted;medications reviewed;nonskid shoes/socks when out of bed;room near unit station;side rails raised x 2;instructed to call staff for mobility
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
## 2018-11-20 NOTE — DISCHARGE SUMMARY
[facility]  Discharge Summary      Admit Date: 11/19/2018    Discharge Date and Time:  11/20/2018 12:39 PM    Attending Physician: Kellie Peres MD     Reason for Admission: GI Bleed    Procedures Performed: Procedure(s) (LRB):  EGD (ESOPHAGOGASTRODUODENOSCOPY) (Left)    Hospital Course (synopsis of major diagnoses, care, treatment, and services provided during the course of the hospital stay): Ongoing     Consults: GI    Significant Diagnostic Studies: EGD    Final Diagnoses:    Principal Problem: Pancytopenia   Secondary Diagnoses: Varices    Discharged Condition: good    Disposition: Admitted as an Inpatient    Follow Up/Patient Instructions: Follow-up with referring physician             Resume previous diet and activity.    Medications:  Transfer Medications (for Discharge Readmit only):   Current Facility-Administered Medications   Medication Dose Route Frequency Provider Last Rate Last Dose    0.45% NaCl infusion   Intravenous Continuous Kellie Peres MD        0.9%  NaCl infusion (for blood administration)   Intravenous Q24H PRN Promise Bennett MD        0.9%  NaCl infusion   Intravenous Continuous Josue Hutton MD 50 mL/hr at 11/20/18 1146      acetaminophen tablet 500 mg  500 mg Oral Q6H PRN Niurka Lamb MD        dextrose 50% injection 12.5 g  12.5 g Intravenous PRN Niurka Lamb MD        dextrose 50% injection 25 g  25 g Intravenous PRN Niurka Lamb MD        glucagon (human recombinant) injection 1 mg  1 mg Intramuscular PRN Niurka Lamb MD        glucose chewable tablet 16 g  16 g Oral PRN Niurka Lamb MD        glucose chewable tablet 24 g  24 g Oral PRN Niurka Lamb MD        insulin aspart U-100 pen 0-5 Units  0-5 Units Subcutaneous PRN Niurka Lamb MD        insulin aspart U-100 pen 3 Units  3 Units Subcutaneous TIDWM Niurka Lamb MD        insulin detemir U-100 pen 10 Units  10 Units Subcutaneous QHS Niurka Lamb MD        ondansetron  injection 8 mg  8 mg Intravenous Q8H PRN Niurka Lamb MD        promethazine (PHENERGAN) 6.25 mg in dextrose 5 % 50 mL IVPB  6.25 mg Intravenous Q6H PRN Niruka Lamb MD        ramelteon tablet 8 mg  8 mg Oral Nightly PRN Niurka Lamb MD        sodium chloride 0.9% flush 3 mL  3 mL Intravenous PRN Blu Martinez MD         Facility-Administered Medications Ordered in Other Encounters   Medication Dose Route Frequency Provider Last Rate Last Dose    lidocaine (cardiac) injection    PRN Natalie C. Belair, CRNA   100 mg at 11/20/18 1222    propofol (DIPRIVAN) 10 mg/mL infusion    PRN Natalie C. Belair, CRNA   20 mg at 11/20/18 1233     No discharge procedures on file.

## 2018-11-20 NOTE — ASSESSMENT & PLAN NOTE
Unclear what medications he takes but will provide basal-prandial insulin therapy along with insulin sliding scale.

## 2018-11-20 NOTE — HOSPITAL COURSE
Mr. Leon Lee is a 63 y.o. male with type 2 diabetes mellitus (HbA1c unknown) and obesity (BMI 37.5) who presents to [facility] ED with complaints of weakness.  He also had some dry mouth.  The weakness has been present for the past week or so but has gotten significant worse  while at work.  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  He also felt lightheaded on postural changes but denies any falls or loss of consciousness.  He denies any shortness of breath, chest pains, nor any palpitations.  He did feel similar symptoms for two days toward the end of last week but he just tried to ignore it.  He does say that he spent 9 days in the [facility] with his wife for an emergency and returned here about a week ago.  He has been jetlagged and says he just can't get back into the rhythm of things.  His appetite has been good and he has been compliant with his medications.  He denies any changes to his medications.  Of note, he was found to be anemic,pancytopenic, in the ED of which he denies a history.  He denies any hematochezia, melena, hematuria, hemoptysis, hematemesis, nor any coffee-ground emesis.  He denies any NSAID use and did have a colonoscopy at [facility] greater than 10 years ago which he says is clear.    Patient was  pancytopenic,S/P 2 PRBC with improvement in HH,,  GI has been consulted and did  EGD.  checked hepatitis panel   Started on IVF for ARF.his ARF is resolved,home BP med's adjusted,  EGD show,  Recently bleeding grade II esophageal varices.                        Incompletely eradicated. Banded.                       - Erythematous mucosa in the antrum. Biopsied.                       - Non-bleeding gastric ulcer with no stigmata of                        bleeding.                       - Normal examined duodenum. Biopsies were taken with                        a cold forceps for  evaluation of celiac disease,                        given his anemia.  Patient used drink  a lot of alcohol in the past,  He was started on octerotide,IV Abx and PPI.  He remains stable with no  Bleeding.  Patient has leticia discharged home with PPI,BB,prophylactic po Cipro, and new BP med;s with Norvasc instead of ARB and diuretic and follow up with PCP and GI in ext 1-2 weeks.

## 2018-11-20 NOTE — NURSING TRANSFER
Nursing Transfer Note      11/19/2018     Transfer From: ED X    Transfer via bed    Transfer with cardiac monitoring    Transported by ED nurse    Medicines sent: no    Chart send with patient: no    Notified: spouse    Patient reassessed at: 11/19/2018 6:30pm    Upon arrival to floor: cardiac monitor applied, patient oriented to room, call bell in reach and bed in lowest position

## 2018-11-20 NOTE — ASSESSMENT & PLAN NOTE
We do not have previous labwork to which to compare the chronicity of his anemia but given his acute onset symptoms, I'm assuming this is new.  He has had recent travel but it doesn't appear to be related given that he has a relatively normal differential on his CBC and no increased LDH or total bilirubin.  An anemia panel reveals low iron and high TIBC which suggests iron deficiency but he has a normal MCV.  Will transfuse pRBC and consult Gastroenterology for further recommendations.

## 2018-11-20 NOTE — PROGRESS NOTES
Patient awake, alert, oriented resting comfortably. Report given to oncoming nurse, CHARITY Rainey. 12hour chart check complete.

## 2018-11-20 NOTE — [employer] PATIENT INSTRUCTIONS
Discharge Summary/Instructions after an Endoscopic Procedure  Patient Name: Leon Lee  Patient MRN: [id number]  Patient YOB: 1955 Tuesday, November 20, 2018  Blu Martinez MD  RESTRICTIONS:  During your procedure today, you received medications for sedation.  These   medications may affect your judgment, balance and coordination.  Therefore,   for 24 hours, you have the following restrictions:   - DO NOT drive a car, operate machinery, make legal/financial decisions,   sign important papers or drink alcohol.    ACTIVITY:  Today: no heavy lifting, straining or running due to procedural   sedation/anesthesia.  The following day: return to full activity including work.  DIET:  Eat and drink normally unless instructed otherwise.     TREATMENT FOR COMMON SIDE EFFECTS:  - Mild abdominal pain, nausea, belching, bloating or excessive gas:  rest,   eat lightly and use a heating pad.  - Sore Throat: treat with throat lozenges and/or gargle with warm salt   water.  - Because air was used during the procedure, expelling large amounts of air   from your rectum or belching is normal.  - If a bowel prep was taken, you may not have a bowel movement for 1-3 days.    This is normal.  SYMPTOMS TO WATCH FOR AND REPORT TO YOUR PHYSICIAN:  1. Abdominal pain or bloating, other than gas cramps.  2. Chest pain.  3. Back pain.  4. Signs of infection such as: chills or fever occurring within 24 hours   after the procedure.  5. Rectal bleeding, which would show as bright red, maroon, or black stools.   (A tablespoon of blood from the rectum is not serious, especially if   hemorrhoids are present.)  6. Vomiting.  7. Weakness or dizziness.  GO DIRECTLY TO THE NEAREST EMERGENCY ROOM IF YOU HAVE ANY OF THE FOLLOWING:      Difficulty breathing              Chills and/or fever over 101 F   Persistent vomiting and/or vomiting blood   Severe abdominal pain   Severe chest pain   Black, tarry stools   Bleeding- more than one  tablespoon   Any other symptom or condition that you feel may need urgent attention  Your doctor recommends these additional instructions:  If any biopsies were taken, your doctors clinic will contact you in 1 to 2   weeks with any results.  - Await pathology results.   - No ibuprofen, naproxen, or other non-steroidal anti-inflammatory drugs.   - Use Protonix (pantoprazole) 40 mg PO BID for 1 month.   - Octreotide and ceftriaxone will be started.  - A hepatitis panel is pending.  He may have JI cirrhosis.  - Repeat EGD in 4 weeks with repeat variceal banding.  - The patient can undergo an outpatient screening colonoscopy.  - He will be monitored briefly.  - Return patient to hospital [facility] for ongoing care.  For questions, problems or results please call your physician - Blu Martinez MD at Work:  [phone].  [facility] can be reached at [phone]     IF A COMPLICATION OR EMERGENCY SITUATION ARISES AND YOU ARE UNABLE TO REACH   YOUR PHYSICIAN - GO DIRECTLY TO THE EMERGENCY ROOM.  Blu Martinez MD  11/20/2018 12:45:58 PM  This report has been verified and signed electronically.  [employer]

## 2018-11-20 NOTE — CONSULTS
"Chief Complaint:  "I was weak and short of breath."    HPI:  The patient is a 63 year old man with a history of HTN, DM, bronchitis, and anemia presenting with symptomatic anemia.  He has been having dyspnea, weakness, and lightheadedness.  The patient went to the [facility] for 9 days for an emergency and he returned one week ago.  He denies having any gross bleeding, abdominal pain, weight loss, nausea, emesis, diarrhea, or constipation. The patient does not take NSAIDs or use anticoagulation.  He states he had a normal colonoscopy over 10 years ago.    Past Medical History:   Diagnosis Date    Anemia 2018    Bronchitis     2018    Cigarette smoker 2018    reports quiting in early     Depression 2018    grandson recently killed in motorcycle accident    Diabetes mellitus     Hypertension     Obese      Past Surgical History:   Procedure Laterality Date    APPENDECTOMY      VASECTOMY       History reviewed. No pertinent family history.  Social History     Socioeconomic History    Marital status:      Spouse name: Not on file    Number of children: Not on file    Years of education: Not on file    Highest education level: Not on file   Social Needs    Financial resource strain: Not on file    Food insecurity - worry: Not on file    Food insecurity - inability: Not on file    Transportation needs - medical: Not on file    Transportation needs - non-medical: Not on file   Occupational History    Not on file   Tobacco Use    Smoking status: Former Smoker     Types: Cigarettes     Last attempt to quit: 10/19/2018     Years since quittin.0   Substance and Sexual Activity    Alcohol use: No     Frequency: Never    Drug use: No    Sexual activity: Not on file   Other Topics Concern    Not on file   Social History Narrative    Not on file        insulin aspart U-100  3 Units Subcutaneous TIDWM    insulin detemir U-100  10 Units " Subcutaneous QHS     Review of patient's allergies indicates:   Allergen Reactions    Trulicity [dulaglutide] Itching       ROS:  No chest pain or dyspnea.  No dysuria.  No heartburn or dysphagia.  Otherwise as stated above.  Ten other systems negative.    Vitals:    11/20/18 0044 11/20/18 0100 11/20/18 0207 11/20/18 0356   BP: (!) 123/59 130/60 131/63 136/65   BP Location:    Left arm   Patient Position:    Lying   Pulse: 68 68 66 70   Resp: 18 16 16 18   Temp: 98.3 °F (36.8 °C) 98.3 °F (36.8 °C) 98.3 °F (36.8 °C) 98.1 °F (36.7 °C)   TempSrc: Oral   Oral   SpO2: 97% 97% 96% 95%   Weight:       Height:         P.E.:  GEN: A x O x 3, NAD  SKIN: No jaundice  HEENT: EOMI, PERRL, anicteric sclera  CV: RRR, no M/R/G  Chest: CTA B  Abdomen: soft, NTND, normoactive BS  Ext: No C/C/E.  2+ dorsalis pedis pulses B  Neuro: No asterixes or tremors.  CN II-XII intact  Musculoskeletal: 5/5 strength bilaterally    Labs:  Recent Results (from the past 336 hour(s))   CBC auto differential    Collection Time: 11/20/18  6:20 AM   Result Value Ref Range    WBC 2.98 (L) 3.90 - 12.70 K/uL    Hemoglobin 8.3 (L) 14.0 - 18.0 g/dL    Hematocrit 26.0 (L) 40.0 - 54.0 %    Platelets 55 (L) 150 - 350 K/uL   CBC auto differential    Collection Time: 11/19/18  2:40 PM   Result Value Ref Range    WBC 2.61 (L) 3.90 - 12.70 K/uL    Hemoglobin 6.1 (L) 14.0 - 18.0 g/dL    Hematocrit 20.1 (L) 40.0 - 54.0 %    Platelets 66 (L) 150 - 350 K/uL     CMP  Sodium   Date Value Ref Range Status   11/19/2018 139 136 - 145 mmol/L Final     Potassium   Date Value Ref Range Status   11/19/2018 4.5 3.5 - 5.1 mmol/L Final     Chloride   Date Value Ref Range Status   11/19/2018 109 95 - 110 mmol/L Final     CO2   Date Value Ref Range Status   11/19/2018 20 (L) 23 - 29 mmol/L Final     Glucose   Date Value Ref Range Status   11/19/2018 109 70 - 110 mg/dL Final     BUN, Bld   Date Value Ref Range Status   11/19/2018 36 (H) 8 - 23 mg/dL Final     Creatinine   Date Value  Ref Range Status   11/19/2018 2.1 (H) 0.5 - 1.4 mg/dL Final     Calcium   Date Value Ref Range Status   11/19/2018 8.4 (L) 8.7 - 10.5 mg/dL Final     Total Protein   Date Value Ref Range Status   11/19/2018 6.6 6.0 - 8.4 g/dL Final     Albumin   Date Value Ref Range Status   11/19/2018 3.3 (L) 3.5 - 5.2 g/dL Final     Total Bilirubin   Date Value Ref Range Status   11/19/2018 0.5 0.1 - 1.0 mg/dL Final     Comment:     For infants and newborns, interpretation of results should be based  on gestational age, weight and in agreement with clinical  observations.  Premature Infant recommended reference ranges:  Up to 24 hours.............<8.0 mg/dL  Up to 48 hours............<12.0 mg/dL  3-5 days..................<15.0 mg/dL  6-29 days.................<15.0 mg/dL       Alkaline Phosphatase   Date Value Ref Range Status   11/19/2018 113 55 - 135 U/L Final     AST   Date Value Ref Range Status   11/19/2018 30 10 - 40 U/L Final     ALT   Date Value Ref Range Status   11/19/2018 26 10 - 44 U/L Final     Anion Gap   Date Value Ref Range Status   11/19/2018 10 8 - 16 mmol/L Final     eGFR if    Date Value Ref Range Status   11/19/2018 37 (A) >60 mL/min/1.73 m^2 Final     eGFR if non    Date Value Ref Range Status   11/19/2018 32 (A) >60 mL/min/1.73 m^2 Final     Comment:     Calculation used to obtain the estimated glomerular filtration  rate (eGFR) is the CKD-EPI equation.          Recent Labs   Lab 11/19/18  1440   INR 1.0   APTT 25.3     Ultrasound:    Vague small echogenic area at the midpole of the right kidney, a small lesion cannot be excluded, consider further evaluation with CT or MRI renal protocol if clinically warranted.    Nonvisualization of the pancreas due to overlying intestinal gas.    A/P:  The patient is a 63 year old man with a history of HTN, DM, bronchitis, and anemia presenting with symptomatic anemia.  1.  Anemia - he has normocytic anemia and his H/H corrected  appropriately after he was transfused pRBCs.  He can remain on protonix and undergo an EGD.  The patient can undergo an outpatient screening colonoscopy.    Thank you for this consult.

## 2018-11-20 NOTE — H&P
[facility] Hospital Medicine  History & Physical    Patient Name: Leon Lee  MRN: [id number]  Admission Date: 11/19/2018  Attending Physician: Kellie Peres MD   Primary Care Provider: Cale Ortiz MD         Patient information was obtained from patient.     Subjective:     Principal Problem:Symptomatic anemia    Chief Complaint: Weakness today.    HPI: Mr. Leon Lee is a 63 y.o. male with type 2 diabetes mellitus (HbA1c unknown) and obesity (BMI 37.5) who presents to [facility] ED with complaints of weakness today.  He also had some dry mouth.  The weakness has been present for the past week or so but has gotten significant worse today while at work.  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  He also felt lightheaded on postural changes but denies any falls or loss of consciousness.  He denies any shortness of breath, chest pains, nor any palpitations.  He did feel similar symptoms for two days toward the end of last week but he just tried to ignore it.  He does say that he spent 9 days in the [facility] with his wife for an emergency and returned here about a week ago.  He has been jetlagged and says he just can't get back into the rhythm of things.  His appetite has been good and he has been compliant with his medications.  He denies any changes to his medications.    Of note, he was found to be anemic in the ED of which he denies a history.  He denies any hematochezia, melena, hematuria, hemoptysis, hematemesis, nor any coffee-ground emesis.  He denies any NSAID use and did have a colonoscopy at [facility] greater than 10 years ago which he says is clear.      Chart Review:  Patient has not had any recent hospitalizations or outpatient clinic visits within the system.    Past Medical History:   Diagnosis Date    Anemia 11/19/2018    Bronchitis      2018    Cigarette smoker 2018    reports quiting in early     Depression 2018    phillip recently killed in motorcycle accident    Diabetes mellitus     Hypertension     Obese        Past Surgical History:   Procedure Laterality Date    APPENDECTOMY      VASECTOMY         Review of patient's allergies indicates:   Allergen Reactions    Trulicity [dulaglutide] Itching       No current facility-administered medications on file prior to encounter.      Current Outpatient Medications on File Prior to Encounter   Medication Sig    UNKNOWN TO PATIENT      Family History     None        Tobacco Use    Smoking status: Former Smoker     Types: Cigarettes     Last attempt to quit: 10/19/2018     Years since quittin.0   Substance and Sexual Activity    Alcohol use: No     Frequency: Never    Drug use: No    Sexual activity: Not on file     Review of Systems   Constitutional: Positive for activity change and fatigue. Negative for appetite change, chills, diaphoresis, fever and unexpected weight change.   HENT: Negative.    Eyes: Negative.    Respiratory: Negative for cough, chest tightness, shortness of breath and wheezing.    Cardiovascular: Positive for leg swelling. Negative for chest pain and palpitations.   Gastrointestinal: Negative for abdominal distention, abdominal pain, blood in stool, constipation, diarrhea, nausea and vomiting.   Genitourinary: Negative for dysuria and hematuria.   Musculoskeletal: Negative.    Skin: Negative.    Neurological: Positive for weakness and light-headedness. Negative for dizziness, seizures and syncope.   Psychiatric/Behavioral: Negative.      Objective:     Vital Signs (Most Recent):  Temp: 97.7 °F (36.5 °C) (18)  Pulse: 70 (18)  Resp: 18 (18)  BP: 131/61 (18)  SpO2: 97 % (18) Vital Signs (24h Range):  Temp:  [97.7 °F (36.5 °C)-99.6 °F (37.6 °C)] 97.7 °F (36.5 °C)  Pulse:   [67-82] 70  Resp:  [16-25] 18  SpO2:  [96 %-98 %] 97 %  BP: (113-134)/(54-61) 131/61     Weight: 108.3 kg (238 lb 12.1 oz)  Body mass index is 37.39 kg/m².    Physical Exam   Constitutional: He is oriented to person, place, and time. He appears well-developed and well-nourished. No distress.   HENT:   Head: Normocephalic and atraumatic.   Right Ear: External ear normal.   Left Ear: External ear normal.   Nose: Nose normal.   Eyes: Right eye exhibits no discharge. Left eye exhibits no discharge.   Neck: Normal range of motion.   Cardiovascular:   Regular rate and rhythm with a Grade II/VI early systolic murmur, no gallops   Pulmonary/Chest: Effort normal and breath sounds normal. No stridor. No respiratory distress. He has no wheezes. He has no rales. He exhibits no tenderness.   Abdominal: Soft. Bowel sounds are normal. He exhibits no distension. There is no tenderness. There is no rebound and no guarding.   Musculoskeletal: Normal range of motion. He exhibits no edema.   Neurological: He is alert and oriented to person, place, and time.   Skin: Skin is warm and dry. He is not diaphoretic. No erythema.   Psychiatric: He has a normal mood and affect. His behavior is normal. Judgment and thought content normal.   Nursing note and vitals reviewed.          Significant Labs: All pertinent labs within the past 24 hours have been reviewed.    Significant Imaging: I have reviewed and interpreted all pertinent imaging results/findings within the past 24 hours.    Assessment/Plan:     * Symptomatic anemia    We do not have previous labwork to which to compare the chronicity of his anemia but given his acute onset symptoms, I'm assuming this is new.  He has had recent travel but it doesn't appear to be related given that he has a relatively normal differential on his CBC and no increased LDH or total bilirubin.  An anemia panel reveals low iron and high TIBC which suggests iron deficiency but he has a normal MCV.  Will  transfuse pRBC and consult Gastroenterology for further recommendations.     Type 2 diabetes mellitus    Unclear what medications he takes but will provide basal-prandial insulin therapy along with insulin sliding scale.     Obesity, Class II, BMI 35-39.9    The patient has been counseled on the negative impact that obesity imparts on his health and was encouraged to make lifestyle changes in order to lose weight and decrease his modifiable risk factors.       VTE Risk Mitigation (From admission, onward)    None           Niurka Lamb M.D.  Staff Nocturnist  Department of Hospital Medicine  [facility]  Pager: [phone]

## 2018-11-20 NOTE — PROGRESS NOTES
Patient returned from procedure. Telemetry monitor continued. Patient accompanied back in bed. Denies pain or discomfort. Will continue to monitor.

## 2018-11-20 NOTE — ASSESSMENT & PLAN NOTE
The patient has been counseled on the negative impact that obesity imparts on his health and was encouraged to make lifestyle changes in order to lose weight and decrease his modifiable risk factors.

## 2018-11-20 NOTE — PLAN OF CARE
"SW met with patient and spouse to complete discharge needs assessment. SW reviewed with patient and spouse contents of "[facility]" including "help at home", "things patient responsible for to manage her health at home" and "preferences". Patient was able to verbalize his help at home is his spouse Esequiel. SW discussed with patient the things he will be responsible for to manage his health at home would be going on doctor appointments, taking medications as prescribed, and getting prescriptions filled. SW wrote name and phone number on white communication board. Patient prefers doctor appointments on Mondays.      PHARMACY: [facility]         11/20/18 1546   Discharge Assessment   Assessment Type Discharge Planning Assessment   Confirmed/corrected address and phone number on facesheet? Yes   Assessment information obtained from? Patient   Communicated expected length of stay with patient/caregiver no   Prior to hospitilization cognitive status: Alert/Oriented;No Deficits   Prior to hospitalization functional status: Independent   Current cognitive status: Alert/Oriented;No Deficits   Current Functional Status: Independent   Facility Arrived From: (Home)   Lives With spouse   Able to Return to Prior Arrangements yes   Is patient able to care for self after discharge? Yes   Who are your caregiver(s) and their phone number(s)? (Esequiel (spouse) [phone])   Patient's perception of discharge disposition home or selfcare   Readmission Within The Last 30 Days no previous admission in last 30 days   Patient currently being followed by outpatient case management? No   Patient currently receives any other outside agency services? No   Equipment Currently Used at Home none   Do you have any problems affording any of your prescribed medications? No   Is the patient taking medications as prescribed? yes   Does the patient have transportation home? Yes   Transportation Available car;family or friend " will provide   Dialysis Name and Scheduled days (N/A)   Does the patient receive services at the [facility]mad[facility] Clinic? No   Discharge Plan A Home with family  (PCP F/U)   Discharge Plan B Home with family   Patient/Family In Agreement With Plan yes   Does the patient have transportation to healthcare appointments? Yes

## 2018-11-20 NOTE — NURSING
New admit from ED. Oriented to room, equipment, plan of care. Fall risks reviewed. SR up x2. Bed alarm on. Instructed to call for assistance. Blood administration education complete and consent for transfusion of blood obtained. All questions answered.

## 2018-11-20 NOTE — TRANSFER OF CARE
"Anesthesia Transfer of Care Note    Patient: Leon Lee    Procedure(s) Performed: Procedure(s) (LRB):  EGD (ESOPHAGOGASTRODUODENOSCOPY) (Left)    Patient location: GI    Anesthesia Type: general    Transport from OR: Transported from OR on room air with adequate spontaneous ventilation    Post pain: adequate analgesia    Post assessment: no apparent anesthetic complications    Post vital signs: stable    Level of consciousness: awake and responds to stimulation    Nausea/Vomiting: no nausea/vomiting    Complications: none    Transfer of care protocol was followed      Last vitals:   Visit Vitals  BP (!) 118/57 (BP Location: Left arm, Patient Position: Lying)   Pulse 74   Temp 37.1 °C (98.8 °F) (Oral)   Resp 16   Ht 5' 7" (1.702 m)   Wt 108.3 kg (238 lb 12.1 oz)   SpO2 (!) 92%   BMI 37.39 kg/m²     "

## 2018-11-20 NOTE — ASSESSMENT & PLAN NOTE
We do not have previous labwork to which to compare the chronicity of his anemia but given his acute onset symptoms, I'm assuming this is new.  He has had recent travel but it doesn't appear to be related given that he has a relatively normal differential on his CBC and no increased LDH or total bilirubin.  An anemia panel reveals low iron and high TIBC which suggests iron deficiency but he has a normal MCV.  Patient is pancytopenic,S/P 2 PRBC with improvement in HH,,  GI has been consulted and planing doing EGD.  Will check hepatitis panel and rapid HIV test.consult hematology.

## 2018-11-21 VITALS
SYSTOLIC BLOOD PRESSURE: 157 MMHG | TEMPERATURE: 98 F | DIASTOLIC BLOOD PRESSURE: 70 MMHG | HEIGHT: 67 IN | HEART RATE: 68 BPM | BODY MASS INDEX: 37.13 KG/M2 | WEIGHT: 236.56 LBS | OXYGEN SATURATION: 93 % | RESPIRATION RATE: 18 BRPM

## 2018-11-21 LAB
ALBUMIN SERPL BCP-MCNC: 3.6 G/DL
ALP SERPL-CCNC: 112 U/L
ALT SERPL W/O P-5'-P-CCNC: 29 U/L
ANION GAP SERPL CALC-SCNC: 7 MMOL/L
AST SERPL-CCNC: 32 U/L
BASOPHILS # BLD AUTO: 0.01 K/UL
BASOPHILS NFR BLD: 0.2 %
BILIRUB SERPL-MCNC: 1.3 MG/DL
BUN SERPL-MCNC: 21 MG/DL
CALCIUM SERPL-MCNC: 8.4 MG/DL
CHLORIDE SERPL-SCNC: 110 MMOL/L
CO2 SERPL-SCNC: 18 MMOL/L
CREAT SERPL-MCNC: 1.4 MG/DL
DIFFERENTIAL METHOD: ABNORMAL
EOSINOPHIL # BLD AUTO: 0.1 K/UL
EOSINOPHIL NFR BLD: 2.7 %
ERYTHROCYTE [DISTWIDTH] IN BLOOD BY AUTOMATED COUNT: 15.4 %
EST. GFR  (AFRICAN AMERICAN): >60 ML/MIN/1.73 M^2
EST. GFR  (NON AFRICAN AMERICAN): 52 ML/MIN/1.73 M^2
GLUCOSE SERPL-MCNC: 237 MG/DL
HAV IGM SERPL QL IA: NEGATIVE
HBV CORE IGM SERPL QL IA: NEGATIVE
HBV SURFACE AG SERPL QL IA: NEGATIVE
HCT VFR BLD AUTO: 29.3 %
HCV AB SERPL QL IA: ABNORMAL
HGB BLD-MCNC: 9.2 G/DL
LYMPHOCYTES # BLD AUTO: 0.3 K/UL
LYMPHOCYTES NFR BLD: 6.4 %
MCH RBC QN AUTO: 28.8 PG
MCHC RBC AUTO-ENTMCNC: 31.4 G/DL
MCV RBC AUTO: 92 FL
MONOCYTES # BLD AUTO: 0.5 K/UL
MONOCYTES NFR BLD: 11.7 %
NEUTROPHILS # BLD AUTO: 3.2 K/UL
NEUTROPHILS NFR BLD: 79 %
PLATELET # BLD AUTO: 54 K/UL
PMV BLD AUTO: 11.9 FL
POCT GLUCOSE: 163 MG/DL (ref 70–110)
POTASSIUM SERPL-SCNC: 4.6 MMOL/L
PROT SERPL-MCNC: 7.6 G/DL
RBC # BLD AUTO: 3.19 M/UL
SODIUM SERPL-SCNC: 135 MMOL/L
WBC # BLD AUTO: 4.09 K/UL

## 2018-11-21 PROCEDURE — 85025 COMPLETE CBC W/AUTO DIFF WBC: CPT

## 2018-11-21 PROCEDURE — 36415 COLL VENOUS BLD VENIPUNCTURE: CPT

## 2018-11-21 PROCEDURE — 25000003 PHARM REV CODE 250: Performed by: INTERNAL MEDICINE

## 2018-11-21 PROCEDURE — 63600175 PHARM REV CODE 636 W HCPCS: Performed by: INTERNAL MEDICINE

## 2018-11-21 PROCEDURE — 80053 COMPREHEN METABOLIC PANEL: CPT

## 2018-11-21 PROCEDURE — 25000003 PHARM REV CODE 250: Performed by: HOSPITALIST

## 2018-11-21 RX ORDER — INSULIN LISPRO 100 [IU]/ML
7 INJECTION, SOLUTION INTRAVENOUS; SUBCUTANEOUS 2 TIMES DAILY
Start: 2018-11-21

## 2018-11-21 RX ORDER — PANTOPRAZOLE SODIUM 40 MG/1
40 TABLET, DELAYED RELEASE ORAL 2 TIMES DAILY
Status: DISCONTINUED | OUTPATIENT
Start: 2018-11-21 | End: 2018-11-21 | Stop reason: HOSPADM

## 2018-11-21 RX ORDER — AMLODIPINE BESYLATE 10 MG/1
10 TABLET ORAL DAILY
Qty: 30 TABLET | Refills: 0 | Status: SHIPPED | OUTPATIENT
Start: 2018-11-21 | End: 2019-09-10

## 2018-11-21 RX ORDER — CARVEDILOL 3.12 MG/1
3.12 TABLET ORAL 2 TIMES DAILY
Status: DISCONTINUED | OUTPATIENT
Start: 2018-11-21 | End: 2018-11-21 | Stop reason: HOSPADM

## 2018-11-21 RX ORDER — CIPROFLOXACIN 500 MG/1
500 TABLET ORAL DAILY
Qty: 7 TABLET | Refills: 0 | Status: SHIPPED | OUTPATIENT
Start: 2018-11-21 | End: 2018-11-28

## 2018-11-21 RX ORDER — INSULIN GLARGINE 100 [IU]/ML
20 INJECTION, SOLUTION SUBCUTANEOUS NIGHTLY
Start: 2018-11-21

## 2018-11-21 RX ORDER — PANTOPRAZOLE SODIUM 40 MG/1
40 TABLET, DELAYED RELEASE ORAL 2 TIMES DAILY
Qty: 60 TABLET | Refills: 1 | Status: SHIPPED | OUTPATIENT
Start: 2018-11-21 | End: 2019-08-12

## 2018-11-21 RX ORDER — CARVEDILOL 3.12 MG/1
3.12 TABLET ORAL 2 TIMES DAILY
Qty: 60 TABLET | Refills: 0 | Status: SHIPPED | OUTPATIENT
Start: 2018-11-21 | End: 2019-09-10

## 2018-11-21 RX ADMIN — SODIUM CHLORIDE: 0.45 INJECTION, SOLUTION INTRAVENOUS at 12:11

## 2018-11-21 RX ADMIN — CARVEDILOL 3.12 MG: 3.12 TABLET, FILM COATED ORAL at 08:11

## 2018-11-21 RX ADMIN — ACETAMINOPHEN 500 MG: 500 TABLET, FILM COATED ORAL at 03:11

## 2018-11-21 RX ADMIN — INSULIN ASPART 3 UNITS: 100 INJECTION, SOLUTION INTRAVENOUS; SUBCUTANEOUS at 08:11

## 2018-11-21 RX ADMIN — PANTOPRAZOLE SODIUM 40 MG: 40 TABLET, DELAYED RELEASE ORAL at 10:11

## 2018-11-21 RX ADMIN — OCTREOTIDE ACETATE 50 MCG/HR: 200 INJECTION, SOLUTION INTRAVENOUS; SUBCUTANEOUS at 01:11

## 2018-11-21 RX ADMIN — SODIUM CHLORIDE: 0.45 INJECTION, SOLUTION INTRAVENOUS at 08:11

## 2018-11-21 NOTE — PROGRESS NOTES
The patient denies having any bleeding overnight.    Vitals:    11/20/18 1554 11/20/18 2006 11/21/18 0028 11/21/18 0448   BP: (!) 148/70 (!) 146/67 (!) 144/65 (!) 147/68   BP Location: Right arm   Left arm   Patient Position: Lying   Sitting   Pulse: 78 76 73 71   Resp: 20 19 20 20   Temp: 97.8 °F (36.6 °C) 98.5 °F (36.9 °C) 97.3 °F (36.3 °C) 97.7 °F (36.5 °C)   TempSrc: Oral   Oral   SpO2: 97% (!) 91% (!) 91% 95%   Weight:    107.3 kg (236 lb 8.9 oz)   Height:          cefTRIAXone (ROCEPHIN) IVPB  1 g Intravenous Q24H    insulin aspart U-100  3 Units Subcutaneous TIDWM    insulin detemir U-100  10 Units Subcutaneous QHS     P.E.:  GEN: A x O x 3, NAD  HEENT: EOMI, PERRL, anicteric sclera  CV: RRR, no M/R/G  Chest: CTA B  Abdomen: soft, NTND, normoactive BS  Ext: No C/C/E. 2+ dorsalis pedis pulses B    Labs:  Recent Results (from the past 336 hour(s))   CBC auto differential    Collection Time: 11/20/18  6:20 AM   Result Value Ref Range    WBC 2.98 (L) 3.90 - 12.70 K/uL    Hemoglobin 8.3 (L) 14.0 - 18.0 g/dL    Hematocrit 26.0 (L) 40.0 - 54.0 %    Platelets 55 (L) 150 - 350 K/uL   CBC auto differential    Collection Time: 11/19/18  2:40 PM   Result Value Ref Range    WBC 2.61 (L) 3.90 - 12.70 K/uL    Hemoglobin 6.1 (L) 14.0 - 18.0 g/dL    Hematocrit 20.1 (L) 40.0 - 54.0 %    Platelets 66 (L) 150 - 350 K/uL     CMP  Sodium   Date Value Ref Range Status   11/20/2018 139 136 - 145 mmol/L Final     Potassium   Date Value Ref Range Status   11/20/2018 5.2 (H) 3.5 - 5.1 mmol/L Final     Chloride   Date Value Ref Range Status   11/20/2018 111 (H) 95 - 110 mmol/L Final     CO2   Date Value Ref Range Status   11/20/2018 19 (L) 23 - 29 mmol/L Final     Glucose   Date Value Ref Range Status   11/20/2018 125 (H) 70 - 110 mg/dL Final     BUN, Bld   Date Value Ref Range Status   11/20/2018 30 (H) 8 - 23 mg/dL Final     Creatinine   Date Value Ref Range Status   11/20/2018 1.6 (H) 0.5 - 1.4 mg/dL Final     Calcium   Date Value  Ref Range Status   11/20/2018 8.4 (L) 8.7 - 10.5 mg/dL Final     Total Protein   Date Value Ref Range Status   11/20/2018 6.9 6.0 - 8.4 g/dL Final     Albumin   Date Value Ref Range Status   11/20/2018 3.3 (L) 3.5 - 5.2 g/dL Final     Total Bilirubin   Date Value Ref Range Status   11/20/2018 2.2 (H) 0.1 - 1.0 mg/dL Final     Comment:     For infants and newborns, interpretation of results should be based  on gestational age, weight and in agreement with clinical  observations.  Premature Infant recommended reference ranges:  Up to 24 hours.............<8.0 mg/dL  Up to 48 hours............<12.0 mg/dL  3-5 days..................<15.0 mg/dL  6-29 days.................<15.0 mg/dL       Alkaline Phosphatase   Date Value Ref Range Status   11/20/2018 117 55 - 135 U/L Final     AST   Date Value Ref Range Status   11/20/2018 29 10 - 40 U/L Final     ALT   Date Value Ref Range Status   11/20/2018 29 10 - 44 U/L Final     Anion Gap   Date Value Ref Range Status   11/20/2018 9 8 - 16 mmol/L Final     eGFR if    Date Value Ref Range Status   11/20/2018 51 (A) >60 mL/min/1.73 m^2 Final     eGFR if non    Date Value Ref Range Status   11/20/2018 45 (A) >60 mL/min/1.73 m^2 Final     Comment:     Calculation used to obtain the estimated glomerular filtration  rate (eGFR) is the CKD-EPI equation.          No results for input(s): PT, INR, APTT in the last 24 hours.    Ultrasound:    Vague small echogenic area at the midpole of the right kidney, a small lesion cannot be excluded, consider further evaluation with CT or MRI renal protocol if clinically warranted.    Nonvisualization of the pancreas due to overlying intestinal gas.     A/P:  The patient is a 63 year old man with a history of HTN, DM, bronchitis, and anemia presenting with symptomatic anemia.  1.  Anemia - he has normocytic anemia and his H/H corrected appropriately after he was transfused pRBCs.  Labs from today are pending.  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  Pathology is pending.  He should avoid NSAIDs and he can continue protonix 40 mg po Q12H.  Octreotide can be stopped later today and he can complete a 7 day course of antibiotics.  A hepatitis panel is pending and he could also have JI cirrhosis.  Carvedilol will be started and this can be titrated to a heart rate of 60.  He will need to undergo a repeat EGD with variceal banding in 4 weeks.  The patient can undergo an outpatient screening colonoscopy.  Gastroenterology has nothing additional to add but will be available for further issues.

## 2018-11-21 NOTE — PROGRESS NOTES
Follow-up Information     Cale Ortiz MD. Go on 11/26/2018.    Specialty:  Internal Medicine  Why:  Outpatient Services, PCP Follow-up Appointment, Please arrive to complete for 8:45AM  Contact information:  [place] [id number] [phone]             Blu Martinez MD. Go on 12/17/2018.    Specialty:  Gastroenterology  Why:  Outpatient Services, GI Follow-up Appointment, Please arrive to clinic for 8:45AM  Contact information:  [address] [place] [address]  [id number]  [facility]  [place] [id number] [phone]                     [facility]    WRITTEN HEALTHCARE AND DISCHARGE INFORMATION                        Help at Home           [phone]  After discharge for assistance [facility] On Call Nurse Care Line 24/7  Assistance    Things You are responsible For To Manage Your Care At Home:  1.    Getting your prescriptions filled   2.    Taking your medications as directed, DO NOT MISS ANY DOSES!  3.    Going to your follow-up doctor appointment. This is important because it  allow the doctor to monitor your progress and determine if  any changes need to made to your treatment plan.     Thank you for choosing [facility] for your care.  Please answer any calls you may receive from [facility] we want to continue to support you as you manage your healthcare needs. [facility] is happy to have the opportunity to serve you.     Sincerely,  Your [facility] Team,  Rena Domingo [facility]   II  [phone]

## 2018-11-21 NOTE — PLAN OF CARE
"SW met with patient to provide discharge follow-up instructions. SW reviewed with patient contents of "[facility]" including "help at home", "things patient responsible for to manage his health at home" and "preferences". Patient was able to verbalize his help at home will be his spouse. DINORAH discussed with patient the things he will be responsible for to manage his health at home will be going on doctor appointments, taking medications as prescribed, and getting prescriptions filled. DINORAH informed nurse Mague  completed all discharge planning for patient and she could complete her nursing education/discharge with patient when ready.           11/21/18 1052   Final Note   Assessment Type Final Discharge Note   Anticipated Discharge Disposition Home   Hospital Follow Up  Appt(s) scheduled? Yes   Right Care Referral Info   Post Acute Recommendation No Care       "

## 2018-11-21 NOTE — NURSING
Discharge instructions given to patient,  verbalized understanding. Patient left with transport by wheelchair to the family vehicle. No distress noted.

## 2018-11-21 NOTE — PROGRESS NOTES
Patient awake, alert, oriented resting comfortably. Report given to oncoming nurse, CHARITY Henriquez. 12hour chart check complete.

## 2018-11-21 NOTE — DISCHARGE SUMMARY
[facility] Hospital Medicine  Discharge Summary      Patient Name: Leon Lee  MRN: [id number]  Admission Date: 11/19/2018  Hospital Length of Stay: 2 days  Discharge Date and Time:  11/21/2018 11:40 AM  Attending Physician: Kellie Peres MD   Discharging Provider: Kellie Peres MD  Primary Care Provider: Cale Ortiz MD      HPI:   Mr. Leon Lee is a 63 y.o. male with type 2 diabetes mellitus (HbA1c unknown) and obesity (BMI 37.5) who presents to [facility] ED with complaints of weakness today.  He also had some dry mouth.  The weakness has been present for the past week or so but has gotten significant worse today while at work.  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  He also felt lightheaded on postural changes but denies any falls or loss of consciousness.  He denies any shortness of breath, chest pains, nor any palpitations.  He did feel similar symptoms for two days toward the end of last week but he just tried to ignore it.  He does say that he spent 9 days in the [facility] with his wife for an emergency and returned here about a week ago.  He has been jetlagged and says he just can't get back into the rhythm of things.  His appetite has been good and he has been compliant with his medications.  He denies any changes to his medications.    Of note, he was found to be anemic in the ED of which he denies a history.  He denies any hematochezia, melena, hematuria, hemoptysis, hematemesis, nor any coffee-ground emesis.  He denies any NSAID use and did have a colonoscopy at [facility] greater than 10 years ago which he says is clear.      Procedure(s) (LRB):  EGD (ESOPHAGOGASTRODUODENOSCOPY) (Left)      Hospital Course:   Mr. Leon Lee is a 63 y.o. male with type 2 diabetes mellitus (HbA1c unknown) and obesity (BMI 37.5) who presents to [facility] ED with  complaints of weakness.  He also had some dry mouth.  The weakness has been present for the past week or so but has gotten significant worse  while at work.  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  He also felt lightheaded on postural changes but denies any falls or loss of consciousness.  He denies any shortness of breath, chest pains, nor any palpitations.  He did feel similar symptoms for two days toward the end of last week but he just tried to ignore it.  He does say that he spent 9 days in the [facility] with his wife for an emergency and returned here about a week ago.  He has been jetlagged and says he just can't get back into the rhythm of things.  His appetite has been good and he has been compliant with his medications.  He denies any changes to his medications.  Of note, he was found to be anemic,pancytopenic, in the ED of which he denies a history.  He denies any hematochezia, melena, hematuria, hemoptysis, hematemesis, nor any coffee-ground emesis.  He denies any NSAID use and did have a colonoscopy at [facility] greater than 10 years ago which he says is clear.    Patient was  pancytopenic,S/P 2 PRBC with improvement in HH,,  GI has been consulted and did  EGD.  checked hepatitis panel   Started on IVF for ARF.his ARF is resolved,home BP med's adjusted,  EGD show,  Recently bleeding grade II esophageal varices.                        Incompletely eradicated. Banded.                       - Erythematous mucosa in the antrum. Biopsied.                       - Non-bleeding gastric ulcer with no stigmata of                        bleeding.                       - Normal examined duodenum. Biopsies were taken with                        a cold forceps for evaluation of celiac disease,                        given his anemia.  Patient used drink  a lot of alcohol in the past,  He was started  on octerotide,IV Abx and PPI.  He remains stable with no  Bleeding.  Patient has leticia discharged home with PPI,BB,prophylactic po Cipro, and new BP med;s with Norvasc instead of ARB and diuretic and follow up with PCP and GI in ext 1-2 weeks.         Consults:   Consults (From admission, onward)        Status Ordering Provider     Inpatient consult to Gastroenterology  Once     Provider:  Reji Choi MD    Acknowledged SOURAV TOVAR     Inpatient consult to Hematology  Once     Provider:  Aziaz Rodriguez MD    Acknowledged MEGHANA MONTERO     Inpatient consult to Hematology/Oncology - Community  Once     Provider:  Mack Truong MD    Acknowledged AMAN HINTON     Inpatient consult to Social Work  Once     Provider:  (Not yet assigned)    Acknowledged AMAN HINTON          No new Assessment & Plan notes have been filed under this hospital service since the last note was generated.  Service: Hospital Medicine    Final Active Diagnoses:    Diagnosis Date Noted POA    PRINCIPAL PROBLEM:  Pancytopenia [D61.818] 11/19/2018 Yes    ARF (acute renal failure) [N17.9] 11/20/2018 Yes    DM (diabetes mellitus) type II controlled with renal manifestation [E11.29] 11/19/2018 Yes    Obesity, Class II, BMI 35-39.9 [E66.9] 11/19/2018 Yes     Chronic      Problems Resolved During this Admission:       Discharged Condition: stable    Disposition: Home or Self Care    Follow Up:  Follow-up Information     Cale Ortiz MD. Go on 11/26/2018.    Specialty:  Internal Medicine  Why:  Outpatient Services, PCP Follow-up Appointment, Please arrive to complete for 8:45AM  Contact information:  [address] [address]  [phone]             Blu Martinez MD. Go on 12/17/2018.    Specialty:  Gastroenterology  Why:  Outpatient Services, GI Follow-up Appointment, Please arrive to clinic for 8:45AM  Contact information:  [address] [place] [address]  [id number]  [facility]  [address] [address]  [phone]                 Patient Instructions:      Activity as tolerated       Significant Diagnostic Studies: Labs:   BMP:   Recent Labs   Lab 11/19/18  1440 11/20/18  0620 11/21/18  0928    125* 237*    139 135*   K 4.5 5.2* 4.6    111* 110   CO2 20* 19* 18*   BUN 36* 30* 21   CREATININE 2.1* 1.6* 1.4   CALCIUM 8.4* 8.4* 8.4*   MG 2.1 2.2  --    , CMP   Recent Labs   Lab 11/19/18  1440 11/20/18  0620 11/21/18  0928    139 135*   K 4.5 5.2* 4.6    111* 110   CO2 20* 19* 18*    125* 237*   BUN 36* 30* 21   CREATININE 2.1* 1.6* 1.4   CALCIUM 8.4* 8.4* 8.4*   PROT 6.6 6.9 7.6   ALBUMIN 3.3* 3.3* 3.6   BILITOT 0.5 2.2* 1.3*   ALKPHOS 113 117 112   AST 30 29 32   ALT 26 29 29   ANIONGAP 10 9 7*   ESTGFRAFRICA 37* 51* >60   EGFRNONAA 32* 45* 52*    and CBC   Recent Labs   Lab 11/19/18  1440 11/20/18  0620 11/21/18  0928   WBC 2.61* 2.98* 4.09   HGB 6.1* 8.3* 9.2*   HCT 20.1* 26.0* 29.3*   PLT 66* 55* 54*     Radiology: X-Ray: CXR: X-Ray Chest 1 View (CXR): No results found for this visit on 11/19/18. and X-Ray Chest PA and Lateral (CXR): No results found for this visit on 11/19/18.    EGD,    Pending Diagnostic Studies:     None         Medications:  Reconciled Home Medications:      Medication List      START taking these medications    amLODIPine 10 MG tablet  Commonly known as:  NORVASC  Take 1 tablet (10 mg total) by mouth once daily.     carvedilol 3.125 MG tablet  Commonly known as:  COREG  Take 1 tablet (3.125 mg total) by mouth 2 (two) times daily.     ciprofloxacin HCl 500 MG tablet  Commonly known as:  CIPRO  Take 1 tablet (500 mg total) by mouth once daily. for 7 days     pantoprazole 40 MG tablet  Commonly known as:  PROTONIX  Take 1 tablet (40 mg total) by mouth 2 (two) times daily.        CHANGE how you take these medications    insulin glargine 100 unit/mL injection  Commonly known as:  LANTUS  Inject 20 Units into the skin every  evening.  What changed:  how much to take     insulin lispro 100 unit/mL injection  Commonly known as:  HUMALOG  Inject 7 Units into the skin 2 (two) times daily.  What changed:  how much to take        CONTINUE taking these medications    atorvastatin 10 MG tablet  Commonly known as:  LIPITOR  Take 10 mg by mouth once daily.     SYNJARDY 12.5-1,000 mg Tab  Generic drug:  empagliflozin-metformin  Take 2 tablets by mouth 2 (two) times daily.        STOP taking these medications    telmisartan-hydrochlorothiazide 80-25 mg per tablet  Commonly known as:  MICARDIS HCT     UNKNOWN TO PATIENT            Indwelling Lines/Drains at time of discharge:   Lines/Drains/Airways          None          Time spent on the discharge of patient: over 30  minutes  Patient was seen and examined on the date of discharge and determined to be suitable for discharge.         Kellie Peres MD  Department of Hospital Medicine  [facility]

## 2018-11-21 NOTE — PLAN OF CARE
Problem: Patient Care Overview  Goal: Plan of Care Review  Plan of care reviewed with patient  at bedside. All questions answered. Fall precautions are in place. Urinal at bedside. Call light within reach. Bed in lowest position.  No complaints throughout the night. PIV intact infusing 1/2 NS at 100. NSR. Will continue to monitor.

## 2018-11-23 NOTE — ANESTHESIA PREPROCEDURE EVALUATION
11/23/2018  Leon Lee is a 63 y.o., male.    Anesthesia Evaluation     I have reviewed the Nursing Notes.      Review of Systems  Anesthesia Hx:  No problems with previous Anesthesia   Social:  Former Smoker    Hematology/Oncology:        Hematology Comments: pancytopenia   Cardiovascular:   Denies Pacemaker. Hypertension  Denies Valvular problems/Murmurs.  Denies MI.  Denies CAD.    Denies CABG/stent.  Denies Dysrhythmias.   Denies Angina.             denies PVD hyperlipidemia    Pulmonary:  Pulmonary Normal    Renal/:   Chronic Renal Disease (improving), ARF    Hepatic/GI:  Hepatic/GI Normal    Neurological:  Neurology Normal    Endocrine:   Diabetes, type 2, using insulin Denies Hypothyroidism. Denies Hyperthyroidism.    Psych:   Psychiatric History          Physical Exam  General:  Obesity    Airway/Jaw/Neck:  AIRWAY FINDINGS: Normal      Chest/Lungs:  Chest/Lungs Clear    Heart/Vascular:  Heart Findings: Normal       Mental Status:  Mental Status Findings: Normal        Anesthesia Plan  Type of Anesthesia, risks & benefits discussed:  Anesthesia Type:  general  Patient's Preference:   Intra-op Monitoring Plan: standard ASA monitors  Intra-op Monitoring Plan Comments:   Post Op Pain Control Plan:   Post Op Pain Control Plan Comments:   Induction:   IV  Beta Blocker:  Patient is on a Beta-Blocker and has received one dose within the past 24 hours (No further documentation required).       Informed Consent: Patient understands risks and agrees with Anesthesia plan.  Questions answered. Anesthesia consent signed with patient.  ASA Score: 2     Day of Surgery Review of History & Physical:  There are no significant changes.  H&P update referred to the provider.         Ready For Surgery From Anesthesia Perspective.

## 2018-11-23 NOTE — ANESTHESIA POSTPROCEDURE EVALUATION
"Anesthesia Post Evaluation    Patient: Leon Lee    Procedure(s) Performed: Procedure(s) (LRB):  EGD (ESOPHAGOGASTRODUODENOSCOPY) (Left)    Final Anesthesia Type: general  Patient location during evaluation: PACU  Patient participation: Yes- Able to Participate  Level of consciousness: awake and alert  Post-procedure vital signs: reviewed and stable  Pain management: adequate  Airway patency: patent  PONV status at discharge: No PONV  Anesthetic complications: no      Cardiovascular status: blood pressure returned to baseline and hemodynamically stable  Respiratory status: unassisted and spontaneous ventilation  Hydration status: euvolemic  Follow-up not needed.        Visit Vitals  BP (!) 157/70   Pulse 68   Temp 36.8 °C (98.2 °F)   Resp 18   Ht 5' 7" (1.702 m)   Wt 107.3 kg (236 lb 8.9 oz)   SpO2 (!) 93%   BMI 37.05 kg/m²       Pain/Willy Score: No Data Recorded      "

## 2018-11-24 LAB
BACTERIA BLD CULT: NORMAL
BACTERIA BLD CULT: NORMAL

## 2019-04-17 ENCOUNTER — HOSPITAL ENCOUNTER (EMERGENCY)
Facility: HOSPITAL | Age: 64
Discharge: HOME | End: 2019-04-17
Attending: EMERGENCY MEDICINE
Payer: MEDICARE

## 2019-04-17 VITALS
WEIGHT: 226 LBS | TEMPERATURE: 98 F | SYSTOLIC BLOOD PRESSURE: 160 MMHG | RESPIRATION RATE: 18 BRPM | OXYGEN SATURATION: 100 % | DIASTOLIC BLOOD PRESSURE: 72 MMHG | BODY MASS INDEX: 35.47 KG/M2 | HEIGHT: 67 IN | HEART RATE: 62 BPM

## 2019-04-17 DIAGNOSIS — I83.009 VENOUS STASIS ULCER: Primary | ICD-10-CM

## 2019-04-17 DIAGNOSIS — L97.909 VENOUS STASIS ULCER: Primary | ICD-10-CM

## 2019-04-17 LAB
GLUCOSE SERPL-MCNC: 120 MG/DL (ref 70–110)
POCT GLUCOSE: 120 MG/DL (ref 70–110)

## 2019-04-17 PROCEDURE — 82962 GLUCOSE BLOOD TEST: CPT

## 2019-04-17 PROCEDURE — 25000003 PHARM REV CODE 250: Performed by: PHYSICIAN ASSISTANT

## 2019-04-17 PROCEDURE — 99283 EMERGENCY DEPT VISIT LOW MDM: CPT

## 2019-04-17 RX ORDER — DEXTROMETHORPHAN HYDROBROMIDE, GUAIFENESIN 5; 100 MG/5ML; MG/5ML
650 LIQUID ORAL EVERY 8 HOURS PRN
Refills: 0 | COMMUNITY
Start: 2019-04-17

## 2019-04-17 RX ORDER — MUPIROCIN 20 MG/G
1 OINTMENT TOPICAL
Status: COMPLETED | OUTPATIENT
Start: 2019-04-17 | End: 2019-04-17

## 2019-04-17 RX ORDER — ACETAMINOPHEN 500 MG
500 TABLET ORAL
Status: COMPLETED | OUTPATIENT
Start: 2019-04-17 | End: 2019-04-17

## 2019-04-17 RX ORDER — MUPIROCIN 20 MG/G
OINTMENT TOPICAL 3 TIMES DAILY
Qty: 22 G | Refills: 0 | Status: SHIPPED | OUTPATIENT
Start: 2019-04-17 | End: 2019-08-12

## 2019-04-17 RX ADMIN — ACETAMINOPHEN 500 MG: 500 TABLET, FILM COATED ORAL at 08:04

## 2019-04-17 RX ADMIN — MUPIROCIN 22 G: 20 OINTMENT TOPICAL at 09:04

## 2019-04-18 NOTE — ED TRIAGE NOTES
Patient reports wound to left lower leg x 1-2 weeks. States he physically scratched the skin off the area in the middle of the night due to an itch. Denies fever, n/v/d. States has been treating with peroxide and neosporin. Area appears reddened with yellow exudate noted.

## 2019-04-18 NOTE — ED PROVIDER NOTES
Encounter Date: 2019    SCRIBE #1 NOTE: I, Kiki Walker, am scribing for, and in the presence of,  Luis Lieberman PA-C. I have scribed the following portions of the note - Other sections scribed: HPI, ROS.       History     Chief Complaint   Patient presents with    Leg Pain     Pt reports wound to the front of the R leg. Pt states he has treated it with home meds but it it getting red and hurting. Pt states it throbs. Pt with hx of diabetes.      CC: Leg Pain    HPI: This 63 y.o Male with a PMHx of anemia, bronchitis, depression, DM, HTN, and obesity presents to the ED for emergent evaluation of pain/wound to anterior aspect of R lower leg. Patient reports he began itching his leg x1 week ago during his sleep and woke up to wound in the morning. Patient has been applying hydrogen peroxide and neosporin on wound every day along with OTC Ibuprofen, with no improvements made. Patient denies fever, chills, myalgias, calf pain, SOB, cough, or N/V/D. No exacerbating factors reported.     The history is provided by the patient. No  was used.     Review of patient's allergies indicates:   Allergen Reactions    Trulicity [dulaglutide] Itching     Past Medical History:   Diagnosis Date    Anemia 2018    Bronchitis     2018    Cigarette smoker 2018    reports quiting in early     Depression 2018    grandson recently killed in motorcycle accident    Diabetes mellitus     Hypertension     Obese      Past Surgical History:   Procedure Laterality Date    APPENDECTOMY      EGD (ESOPHAGOGASTRODUODENOSCOPY) Left 2018    Per[facility]    VASECTOMY       History reviewed. No pertinent family history.  Social History     Tobacco Use    Smoking status: Former Smoker     Types: Cigarettes     Last attempt to quit: 10/19/2018     Years since quittin.4   Substance Use Topics    Alcohol use: No     Frequency:  Never    Drug use: No     Review of Systems   Constitutional: Negative for chills and fever.   HENT: Negative for congestion, ear discharge, ear pain, nosebleeds, rhinorrhea and sore throat.    Respiratory: Negative for cough and chest tightness.    Cardiovascular: Negative for chest pain.   Gastrointestinal: Negative for abdominal pain, diarrhea, nausea and vomiting.   Genitourinary: Negative for dysuria, flank pain, hematuria and urgency.   Musculoskeletal: Negative for back pain, myalgias and neck pain.        (+) pain to lower R leg    Skin: Positive for wound (to anterior aspect of lower R leg). Negative for rash.   Neurological: Negative for dizziness, weakness, light-headedness, numbness and headaches.   Psychiatric/Behavioral: Negative for agitation.       Physical Exam     Initial Vitals [04/17/19 1855]   BP Pulse Resp Temp SpO2   (!) 152/70 64 18 99 °F (37.2 °C) 97 %      MAP       --         Physical Exam    Vitals reviewed.  Constitutional: He appears well-developed and well-nourished. He is not diaphoretic. No distress.   HENT:   Head: Normocephalic and atraumatic.   Right Ear: External ear normal.   Left Ear: External ear normal.   Nose: Nose normal.   Eyes: Conjunctivae are normal. No scleral icterus.   Neck: Normal range of motion. Neck supple.   Cardiovascular: Normal rate, regular rhythm and intact distal pulses.   Pulmonary/Chest: No respiratory distress.   Musculoskeletal: Normal range of motion.   Neurological: He is alert and oriented to person, place, and time.   Skin: Skin is warm and dry. No rash noted. There is erythema.   Right lower anterior medial leg with erythema and ulceration with yellowish crusting.  The erythema and ulceration is overlying hyperpigmented skin of the leg, much the same at the left leg.  He has tenderness over the erythema and ulcerated region.  There is no calf tenderness or pitting edema.         ED Course   Procedures  Labs Reviewed   POCT GLUCOSE - Abnormal;  Notable for the following components:       Result Value    POCT Glucose 120 (*)     All other components within normal limits   POCT GLUCOSE MONITORING CONTINUOUS          Imaging Results          X-Ray Tibia Fibula 2 View Right (Final result)  Result time 04/17/19 21:40:24    Final result by Marlene Eaton MD (04/17/19 21:40:24)                 Impression:      No acute bony abnormality detected.      Electronically signed by: Marlene Eaton  Date:    04/17/2019  Time:    21:40             Narrative:    EXAMINATION:  TWO VIEWS OF THE RIGHT TIBIA AND FIBULA    CLINICAL HISTORY:  Varicose veins of unspecified lower extremity with ulcer of unspecified site    TECHNIQUE:  AP and lateral view of the right tibia and fibula    COMPARISON:  None.    FINDINGS:  Two views of the right tibia and fibula demonstrate no acute fracture or dislocation.                              X-Rays:   Independently Interpreted Readings:   Other Readings:  X-ray right leg with no subcu gas or bony erosion    Medical Decision Making:   ED Management:  63-year-old male with diabetes presents with gradually worsening pain and redness to the right lower leg.  History and exam most consistent with venous stasis ulcer.  He has no fever, systemic symptoms to suggest serious infection or sepsis.  His vital signs are reassuring.  X-ray leg negative for subcu gas or bony erosion.  Low suspicion for gas gangrene, necrotizing fasciitis, osteomyelitis, DVT, ischemic limb.  Patient discharged with ambulatory referral to wound clinic, and instructions for PCP follow-up.  Treated with Tylenol for pain, topical mupirocin and dressing.            Scribe Attestation:   Scribe #1: I performed the above scribed service and the documentation accurately describes the services I performed. I attest to the accuracy of the note.    Attending Attestation:           Physician Attestation for Scribe:  Physician Attestation Statement for Scribe #1: Luis VALDES  MARTINA Lieberman, reviewed documentation, as scribed by Kiki Walker in my presence, and it is both accurate and complete.                    Clinical Impression:        ICD-10-CM ICD-9-CM   1. Venous stasis ulcer I83.009 454.0    L97.909                                 Luis Lieberman PA-C  04/17/19 2233

## 2019-04-29 ENCOUNTER — HOSPITAL ENCOUNTER (OUTPATIENT)
Dept: WOUND CARE | Facility: HOSPITAL | Age: 64
Discharge: HOME | End: 2019-04-29
Attending: FAMILY MEDICINE
Payer: MEDICARE

## 2019-04-29 DIAGNOSIS — L98.499 DIABETIC SKIN ULCER: ICD-10-CM

## 2019-04-29 DIAGNOSIS — E11.622 DIABETIC SKIN ULCER: ICD-10-CM

## 2019-04-29 PROBLEM — N17.9 ARF (ACUTE RENAL FAILURE): Status: RESOLVED | Noted: 2018-11-20 | Resolved: 2019-04-29

## 2019-04-29 PROCEDURE — 99214 OFFICE O/P EST MOD 30 MIN: CPT | Performed by: FAMILY MEDICINE

## 2019-04-29 PROCEDURE — 99203 PR OFFICE/OUTPT VISIT, NEW, LEVL III, 30-44 MIN: ICD-10-PCS | Mod: ,,, | Performed by: FAMILY MEDICINE

## 2019-04-29 PROCEDURE — 99203 OFFICE O/P NEW LOW 30 MIN: CPT | Mod: ,,, | Performed by: FAMILY MEDICINE

## 2019-05-06 ENCOUNTER — HOSPITAL ENCOUNTER (OUTPATIENT)
Dept: WOUND CARE | Facility: HOSPITAL | Age: 64
Discharge: HOME | End: 2019-05-06
Attending: FAMILY MEDICINE
Payer: MEDICARE

## 2019-05-06 DIAGNOSIS — L98.499 DIABETIC SKIN ULCER: Primary | ICD-10-CM

## 2019-05-06 DIAGNOSIS — E11.622 DIABETIC SKIN ULCER: Primary | ICD-10-CM

## 2019-05-06 PROCEDURE — 99213 PR OFFICE/OUTPT VISIT, EST, LEVL III, 20-29 MIN: ICD-10-PCS | Mod: ,,, | Performed by: FAMILY MEDICINE

## 2019-05-06 PROCEDURE — 99212 OFFICE O/P EST SF 10 MIN: CPT | Performed by: FAMILY MEDICINE

## 2019-05-06 PROCEDURE — 99213 OFFICE O/P EST LOW 20 MIN: CPT | Mod: ,,, | Performed by: FAMILY MEDICINE

## 2019-08-12 ENCOUNTER — INITIAL CONSULT (OUTPATIENT)
Dept: HEMATOLOGY/ONCOLOGY | Facility: CLINIC | Age: 64
End: 2019-08-12
Payer: MEDICARE

## 2019-08-12 ENCOUNTER — LAB VISIT (OUTPATIENT)
Dept: LAB | Facility: HOSPITAL | Age: 64
End: 2019-08-12
Attending: INTERNAL MEDICINE
Payer: MEDICARE

## 2019-08-12 VITALS
DIASTOLIC BLOOD PRESSURE: 82 MMHG | SYSTOLIC BLOOD PRESSURE: 159 MMHG | OXYGEN SATURATION: 95 % | BODY MASS INDEX: 33.37 KG/M2 | HEIGHT: 69 IN | WEIGHT: 225.31 LBS | TEMPERATURE: 99 F | HEART RATE: 69 BPM

## 2019-08-12 DIAGNOSIS — D70.9 NEUTROPENIA, UNSPECIFIED TYPE: Primary | ICD-10-CM

## 2019-08-12 DIAGNOSIS — D69.6 THROMBOCYTOPENIA: ICD-10-CM

## 2019-08-12 DIAGNOSIS — R74.8 ELEVATED LIVER ENZYMES: ICD-10-CM

## 2019-08-12 DIAGNOSIS — D70.9 NEUTROPENIA, UNSPECIFIED TYPE: ICD-10-CM

## 2019-08-12 LAB
ALBUMIN SERPL BCP-MCNC: 4.1 G/DL (ref 3.5–5.2)
ALP SERPL-CCNC: 129 U/L (ref 55–135)
ALT SERPL W/O P-5'-P-CCNC: 46 U/L (ref 10–44)
ANION GAP SERPL CALC-SCNC: 7 MMOL/L (ref 8–16)
AST SERPL-CCNC: 45 U/L (ref 10–40)
BASOPHILS # BLD AUTO: 0.01 K/UL (ref 0–0.2)
BASOPHILS NFR BLD: 0.4 % (ref 0–1.9)
BILIRUB SERPL-MCNC: 0.5 MG/DL (ref 0.1–1)
BUN SERPL-MCNC: 21 MG/DL (ref 8–23)
CALCIUM SERPL-MCNC: 9.5 MG/DL (ref 8.7–10.5)
CHLORIDE SERPL-SCNC: 106 MMOL/L (ref 95–110)
CO2 SERPL-SCNC: 27 MMOL/L (ref 23–29)
CREAT SERPL-MCNC: 1.1 MG/DL (ref 0.5–1.4)
DIFFERENTIAL METHOD: ABNORMAL
EOSINOPHIL # BLD AUTO: 0.1 K/UL (ref 0–0.5)
EOSINOPHIL NFR BLD: 5.8 % (ref 0–8)
ERYTHROCYTE [DISTWIDTH] IN BLOOD BY AUTOMATED COUNT: 16.1 % (ref 11.5–14.5)
EST. GFR  (AFRICAN AMERICAN): >60 ML/MIN/1.73 M^2
EST. GFR  (NON AFRICAN AMERICAN): >60 ML/MIN/1.73 M^2
GLUCOSE SERPL-MCNC: 131 MG/DL (ref 70–110)
HCT VFR BLD AUTO: 43.8 % (ref 40–54)
HGB BLD-MCNC: 13.8 G/DL (ref 14–18)
LDH SERPL L TO P-CCNC: 194 U/L (ref 110–260)
LYMPHOCYTES # BLD AUTO: 0.5 K/UL (ref 1–4.8)
LYMPHOCYTES NFR BLD: 21.7 % (ref 18–48)
MCH RBC QN AUTO: 29.1 PG (ref 27–31)
MCHC RBC AUTO-ENTMCNC: 31.5 G/DL (ref 32–36)
MCV RBC AUTO: 92 FL (ref 82–98)
MONOCYTES # BLD AUTO: 0.3 K/UL (ref 0.3–1)
MONOCYTES NFR BLD: 11.5 % (ref 4–15)
NEUTROPHILS # BLD AUTO: 1.4 K/UL (ref 1.8–7.7)
NEUTROPHILS NFR BLD: 60.6 % (ref 38–73)
PLATELET # BLD AUTO: 55 K/UL (ref 150–350)
PMV BLD AUTO: 11.6 FL (ref 9.2–12.9)
POTASSIUM SERPL-SCNC: 4.5 MMOL/L (ref 3.5–5.1)
PROT SERPL-MCNC: 8.1 G/DL (ref 6–8.4)
RBC # BLD AUTO: 4.75 M/UL (ref 4.6–6.2)
SODIUM SERPL-SCNC: 140 MMOL/L (ref 136–145)
WBC # BLD AUTO: 2.26 K/UL (ref 3.9–12.7)

## 2019-08-12 PROCEDURE — 83615 LACTATE (LD) (LDH) ENZYME: CPT

## 2019-08-12 PROCEDURE — 99205 OFFICE O/P NEW HI 60 MIN: CPT | Mod: S$PBB,,, | Performed by: INTERNAL MEDICINE

## 2019-08-12 PROCEDURE — 99213 OFFICE O/P EST LOW 20 MIN: CPT | Mod: PBBFAC | Performed by: INTERNAL MEDICINE

## 2019-08-12 PROCEDURE — 99999 PR PBB SHADOW E&M-EST. PATIENT-LVL III: ICD-10-PCS | Mod: PBBFAC,,, | Performed by: INTERNAL MEDICINE

## 2019-08-12 PROCEDURE — 99999 PR PBB SHADOW E&M-EST. PATIENT-LVL III: CPT | Mod: PBBFAC,,, | Performed by: INTERNAL MEDICINE

## 2019-08-12 PROCEDURE — 36415 COLL VENOUS BLD VENIPUNCTURE: CPT

## 2019-08-12 PROCEDURE — 86803 HEPATITIS C AB TEST: CPT

## 2019-08-12 PROCEDURE — 85025 COMPLETE CBC W/AUTO DIFF WBC: CPT

## 2019-08-12 PROCEDURE — 99205 PR OFFICE/OUTPT VISIT, NEW, LEVL V, 60-74 MIN: ICD-10-PCS | Mod: S$PBB,,, | Performed by: INTERNAL MEDICINE

## 2019-08-12 PROCEDURE — 87340 HEPATITIS B SURFACE AG IA: CPT

## 2019-08-12 PROCEDURE — 80053 COMPREHEN METABOLIC PANEL: CPT

## 2019-08-12 RX ORDER — LANOLIN ALCOHOL/MO/W.PET/CERES
100 CREAM (GRAM) TOPICAL DAILY
COMMUNITY

## 2019-08-12 RX ORDER — ACETAMINOPHEN 500 MG
5000 TABLET ORAL DAILY
COMMUNITY

## 2019-08-12 NOTE — LETTER
August 12, 2019      Casey Leija MD  [address] [place] [address] [address]  [address] [address] [address]           [facility]  [address] [facility] [address]  [place] [address]  Phone: [phone]          Patient: Leon Lee   MR Number: [id number]   YOB: 1955   Date of Visit: 8/12/2019       Dear Dr. Casey Leija:    Thank you for referring Leon Lee to me for evaluation. Attached you will find relevant portions of my assessment and plan of care.    If you have questions, please do not hesitate to call me. I look forward to following Leon Lee along with you.    Sincerely,    Ivan Pop MD    Enclosure  CC:  No Recipients    If you would like to receive this communication electronically, please contact [email or web address] or [phone] to request more information on [employer] access.    For providers and/or their staff who would like to refer a patient to [facility], please contact us through our one-stop-shop provider referral line, [facility], at [phone].    If you feel you have received this communication in error or would no longer like to receive these types of communications, please e-mail [email or web address]

## 2019-08-12 NOTE — PROGRESS NOTES
Chief Complaint :  Low white count    Hx of Present illness :  Patient is a 63 y.o. year old male who presents to the clinic today for  Oncology evaluation      Allergies :    Review of patient's allergies indicates:   Allergen Reactions    Trulicity [dulaglutide] Itching       Occupation :  Aircraft      Transfusion :  Yes    Menstrual & obstetric Hx : N/A      Present Meds :   Medication List with Changes/Refills   Current Medications    ACETAMINOPHEN (TYLENOL) 650 MG TBSR    Take 1 tablet (650 mg total) by mouth every 8 (eight) hours as needed.    AMLODIPINE (NORVASC) 10 MG TABLET    Take 1 tablet (10 mg total) by mouth once daily.    ATORVASTATIN (LIPITOR) 10 MG TABLET    Take 10 mg by mouth once daily.    CARVEDILOL (COREG) 3.125 MG TABLET    Take 1 tablet (3.125 mg total) by mouth 2 (two) times daily.    INSULIN GLARGINE (LANTUS) 100 UNIT/ML INJECTION    Inject 20 Units into the skin every evening.    INSULIN LISPRO (HUMALOG) 100 UNIT/ML INJECTION    Inject 7 Units into the skin 2 (two) times daily.    MUPIROCIN (BACTROBAN) 2 % OINTMENT    Apply topically 3 (three) times daily.    PANTOPRAZOLE (PROTONIX) 40 MG TABLET    Take 1 tablet (40 mg total) by mouth 2 (two) times daily.    SYNJARDY 12.5-1,000 MG TAB    Take 2 tablets by mouth 2 (two) times daily.       Past Medical Hx :  Hypertension; DM; venous stasis; diabetic skin ukcer    Past Medical Hx :  Past Medical History:   Diagnosis Date    Anemia 2018    Bronchitis     2018    Cigarette smoker 2018    reports quiting in early     Depression 2018    grandson recently killed in motorcycle accident    Diabetes mellitus     Hypertension     Obese        Travel Hx :    n/a    Immunization :  Immunization History   Administered Date(s) Administered    Tdap 2017       Family Hx :  Oldest sister recently  of liver and kidney problem  No family history on file.    Social Hx :  Social History      Socioeconomic History    Marital status:      Spouse name: Not on file    Number of children: Not on file    Years of education: Not on file    Highest education level: Not on file   Occupational History    Not on file   Social Needs    Financial resource strain: Not on file    Food insecurity:     Worry: Not on file     Inability: Not on file    Transportation needs:     Medical: Not on file     Non-medical: Not on file   Tobacco Use    Smoking status: Former Smoker     Types: Cigarettes     Last attempt to quit: 10/19/2018     Years since quittin.8   Substance and Sexual Activity    Alcohol use: No     Frequency: Never    Drug use: No    Sexual activity: Not on file   Lifestyle    Physical activity:     Days per week: Not on file     Minutes per session: Not on file    Stress: Not on file   Relationships    Social connections:     Talks on phone: Not on file     Gets together: Not on file     Attends [employer] service: Not on file     Active member of club or organization: Not on file     Attends meetings of clubs or organizations: Not on file     Relationship status: Not on file   Other Topics Concern    Not on file   Social History Narrative    Not on file       Surgery :  EGD;  Colonoscopy 17.  Appendectomy; vasectomy    Symptoms :    Review of Systems   Constitutional: Positive for weight loss. Negative for chills, fever and malaise/fatigue.        Lost 14 Pounds over 10 months.   HENT: Negative for congestion, hearing loss, nosebleeds, sore throat and tinnitus.    Eyes: Negative for blurred vision, double vision and photophobia.   Respiratory: Negative for cough, hemoptysis and shortness of breath.    Cardiovascular: Negative for chest pain, palpitations, orthopnea, claudication and leg swelling.   Gastrointestinal: Negative for abdominal pain, blood in stool, constipation, diarrhea, heartburn, nausea and vomiting.   Genitourinary: Negative for dysuria, flank pain,  frequency, hematuria and urgency.        Nocturia   Musculoskeletal: Positive for back pain. Negative for falls, joint pain, myalgias and neck pain.   Skin: Negative for itching and rash.   Neurological: Negative for dizziness, tingling, tremors, sensory change and headaches.   Endo/Heme/Allergies: Negative for environmental allergies and polydipsia. Does not bruise/bleed easily.   Psychiatric/Behavioral: Negative for depression and memory loss. The patient is nervous/anxious (at times). The patient does not have insomnia.        Physical Exam :   Physical Exam   Constitutional: He is oriented to person, place, and time and well-developed, well-nourished, and in no distress. Vital signs are normal. No distress.   HENT:   Head: Normocephalic and atraumatic.   Right Ear: External ear normal.   Left Ear: External ear normal.   Nose: Nose normal.   Mouth/Throat: Oropharynx is clear and moist. No oropharyngeal exudate.   Eyes: Pupils are equal, round, and reactive to light. Conjunctivae, EOM and lids are normal. Lids are everted and swept, no foreign bodies found. Right eye exhibits no discharge. Left eye exhibits no discharge. No scleral icterus.   Neck: Trachea normal, normal range of motion, full passive range of motion without pain and phonation normal. Neck supple. Normal carotid pulses, no hepatojugular reflux and no JVD present. Carotid bruit is not present. No tracheal deviation present. No thyroid mass and no thyromegaly present.   Cardiovascular: Normal rate, regular rhythm, normal heart sounds, intact distal pulses and normal pulses. PMI is not displaced. Exam reveals no gallop and no friction rub.   No murmur heard.  Pulmonary/Chest: Effort normal and breath sounds normal. No stridor. No apnea. No respiratory distress. He has no wheezes. He has no rales. He exhibits no tenderness.   Abdominal: Soft. Normal appearance, normal aorta and bowel sounds are normal. He exhibits no distension and no mass. There is no  hepatosplenomegaly. There is no tenderness. There is no rebound, no guarding and no CVA tenderness. No hernia.   Musculoskeletal: Normal range of motion. He exhibits edema (Trace edema legs). He exhibits no tenderness or deformity.   Lymphadenopathy:        Head (right side): No submental, no submandibular, no tonsillar, no preauricular, no posterior auricular and no occipital adenopathy present.        Head (left side): No submental, no submandibular, no tonsillar, no preauricular, no posterior auricular and no occipital adenopathy present.     He has no cervical adenopathy.     He has no axillary adenopathy.        Right: No inguinal, no supraclavicular and no epitrochlear adenopathy present.        Left: No inguinal, no supraclavicular and no epitrochlear adenopathy present.   Neurological: He is alert and oriented to person, place, and time. He has normal motor skills, normal sensation, normal strength, normal reflexes and intact cranial nerves. He displays normal reflexes. No cranial nerve deficit. He exhibits normal muscle tone. Gait normal. Coordination normal. GCS score is 15.   Skin: Skin is warm, dry and intact. No rash noted. He is not diaphoretic. No cyanosis or erythema. No pallor. Nails show no clubbing.   Psychiatric: Mood, memory, affect and judgment normal.   Nursing note and vitals reviewed.        Labs & Imaging : 11/21/18 :  NFBS 237; cr 1.4l eGFR 52; ca 8.4; bili 1.3; ;iver enzymes marino;/  WBC 4,090.  hgb 8.2; hct 29.9; MCV 92. Platelets 54,000 ANC 3,200  07/15/19 : Nfbs 111; CR.1.08; cA 9.5. BILi 0.8. . AST 37;  WBC 2,100.  ANC 1,296. . Platelets 47,000 Hgb 13.6; hct 41.2. MCV 86.7      Dx :  Thrombocytopenia; neutropenia. Elevated liver enzymes.      Assessment & Plan: Reviewed with patient & Spouse.  Will obtain CBC,CMP,LDH , HCV,HepB Sag. CT Abdomen.  Review  Smear. followup with results.

## 2019-08-14 LAB
HBV SURFACE AG SERPL QL IA: NEGATIVE
HCV AB SERPL QL IA: ABNORMAL

## 2019-08-19 ENCOUNTER — HOSPITAL ENCOUNTER (OUTPATIENT)
Dept: RADIOLOGY | Facility: HOSPITAL | Age: 64
Discharge: HOME | End: 2019-08-19
Attending: INTERNAL MEDICINE
Payer: MEDICARE

## 2019-08-19 DIAGNOSIS — R74.8 ELEVATED LIVER ENZYMES: ICD-10-CM

## 2019-08-19 DIAGNOSIS — D70.9 NEUTROPENIA, UNSPECIFIED TYPE: ICD-10-CM

## 2019-08-19 DIAGNOSIS — D69.6 THROMBOCYTOPENIA: ICD-10-CM

## 2019-08-19 PROCEDURE — 25500020 PHARM REV CODE 255: Performed by: INTERNAL MEDICINE

## 2019-08-19 PROCEDURE — 74160 CT ABDOMEN WITH CONTRAST: ICD-10-PCS | Mod: 26,,, | Performed by: RADIOLOGY

## 2019-08-19 PROCEDURE — 74160 CT ABDOMEN W/CONTRAST: CPT | Mod: 26,,, | Performed by: RADIOLOGY

## 2019-08-19 PROCEDURE — 74160 CT ABDOMEN W/CONTRAST: CPT | Mod: TC

## 2019-08-19 RX ADMIN — IOHEXOL 75 ML: 350 INJECTION, SOLUTION INTRAVENOUS at 02:08

## 2019-08-19 RX ADMIN — IOHEXOL 15 ML: 300 INJECTION, SOLUTION INTRAVENOUS at 02:08

## 2019-09-10 ENCOUNTER — LAB VISIT (OUTPATIENT)
Dept: LAB | Facility: HOSPITAL | Age: 64
End: 2019-09-10
Attending: INTERNAL MEDICINE
Payer: MEDICARE

## 2019-09-10 ENCOUNTER — OFFICE VISIT (OUTPATIENT)
Dept: HEMATOLOGY/ONCOLOGY | Facility: CLINIC | Age: 64
End: 2019-09-10
Payer: MEDICARE

## 2019-09-10 VITALS
HEIGHT: 69 IN | TEMPERATURE: 100 F | DIASTOLIC BLOOD PRESSURE: 72 MMHG | WEIGHT: 221.13 LBS | SYSTOLIC BLOOD PRESSURE: 155 MMHG | HEART RATE: 64 BPM | BODY MASS INDEX: 32.75 KG/M2 | OXYGEN SATURATION: 95 %

## 2019-09-10 DIAGNOSIS — D70.9 NEUTROPENIA, UNSPECIFIED TYPE: Primary | ICD-10-CM

## 2019-09-10 DIAGNOSIS — R74.8 ELEVATED LIVER ENZYMES: ICD-10-CM

## 2019-09-10 DIAGNOSIS — D69.6 THROMBOCYTOPENIA: ICD-10-CM

## 2019-09-10 DIAGNOSIS — R16.1 SPLENOMEGALY: ICD-10-CM

## 2019-09-10 PROCEDURE — 86803 HEPATITIS C AB TEST: CPT

## 2019-09-10 PROCEDURE — 99213 OFFICE O/P EST LOW 20 MIN: CPT | Mod: PBBFAC | Performed by: INTERNAL MEDICINE

## 2019-09-10 PROCEDURE — 99213 PR OFFICE/OUTPT VISIT, EST, LEVL III, 20-29 MIN: ICD-10-PCS | Mod: S$PBB,,, | Performed by: INTERNAL MEDICINE

## 2019-09-10 PROCEDURE — 36415 COLL VENOUS BLD VENIPUNCTURE: CPT

## 2019-09-10 PROCEDURE — 99213 OFFICE O/P EST LOW 20 MIN: CPT | Mod: S$PBB,,, | Performed by: INTERNAL MEDICINE

## 2019-09-10 PROCEDURE — 99999 PR PBB SHADOW E&M-EST. PATIENT-LVL III: CPT | Mod: PBBFAC,,, | Performed by: INTERNAL MEDICINE

## 2019-09-10 PROCEDURE — 99999 PR PBB SHADOW E&M-EST. PATIENT-LVL III: ICD-10-PCS | Mod: PBBFAC,,, | Performed by: INTERNAL MEDICINE

## 2019-09-10 NOTE — PROGRESS NOTES
Chief Complaint :  Low white count. Low Platelet count.    Hx of Present illness :  Patient is a 63 y.o. year old male who presents to the clinic today for  Oncology  Followup.  Here to discuss management      Allergies :    Review of patient's allergies indicates:   Allergen Reactions    Trulicity [dulaglutide] Itching       Occupation :  Aircraft      Transfusion :  Yes    Menstrual & obstetric Hx : N/A      Present Meds :   Medication List with Changes/Refills   Current Medications    ACETAMINOPHEN (TYLENOL) 650 MG TBSR    Take 1 tablet (650 mg total) by mouth every 8 (eight) hours as needed.    AMLODIPINE (NORVASC) 10 MG TABLET    Take 1 tablet (10 mg total) by mouth once daily.    ATORVASTATIN (LIPITOR) 10 MG TABLET    Take 10 mg by mouth once daily.    CARVEDILOL (COREG) 3.125 MG TABLET    Take 1 tablet (3.125 mg total) by mouth 2 (two) times daily.    CHOLECALCIFEROL, VITAMIN D3, (VITAMIN D3) 5,000 UNIT TAB    Take 5,000 Units by mouth once daily.    CYANOCOBALAMIN (VITAMIN B-12) 1000 MCG TABLET    Take 100 mcg by mouth once daily.    EXENATIDE MICROSPHERES (BYDUREON) 2 MG/0.65 ML PNIJ    Inject into the skin.    INSULIN GLARGINE (LANTUS) 100 UNIT/ML INJECTION    Inject 20 Units into the skin every evening.    INSULIN LISPRO (HUMALOG) 100 UNIT/ML INJECTION    Inject 7 Units into the skin 2 (two) times daily.    SYNJARDY 12.5-1,000 MG TAB    Take 2 tablets by mouth 2 (two) times daily.       Past Medical Hx :  Hypertension; DM; venous stasis; diabetic skin ukcer    Past Medical Hx :  Past Medical History:   Diagnosis Date    Anemia 11/19/2018    Bronchitis     October/November 2018    Cigarette smoker 11/19/2018    reports quiting in early October, 2018    Depression 11/19/2018    grandson recently killed in motorcycle accident    Diabetes mellitus     Hypertension     Obese        Travel Hx :    n/a    Immunization :  Immunization History   Administered Date(s) Administered    Tdap 11/01/2017        Family Hx :  Oldest sister recently  of liver and kidney problem  No family history on file.    Social Hx :  Social History     Socioeconomic History    Marital status:      Spouse name: Not on file    Number of children: Not on file    Years of education: Not on file    Highest education level: Not on file   Occupational History    Not on file   Social Needs    Financial resource strain: Not on file    Food insecurity:     Worry: Not on file     Inability: Not on file    Transportation needs:     Medical: Not on file     Non-medical: Not on file   Tobacco Use    Smoking status: Former Smoker     Types: Cigarettes     Last attempt to quit: 10/19/2018     Years since quittin.8   Substance and Sexual Activity    Alcohol use: No     Frequency: Never    Drug use: No    Sexual activity: Not on file   Lifestyle    Physical activity:     Days per week: Not on file     Minutes per session: Not on file    Stress: Not on file   Relationships    Social connections:     Talks on phone: Not on file     Gets together: Not on file     Attends [employer] service: Not on file     Active member of club or organization: Not on file     Attends meetings of clubs or organizations: Not on file     Relationship status: Not on file   Other Topics Concern    Not on file   Social History Narrative    Not on file       Surgery :  EGD;  Colonoscopy 17.  Appendectomy; vasectomy    Symptoms :    Review of Systems   Constitutional: Positive for weight loss. Negative for chills, fever and malaise/fatigue.        Lost 14 Pounds over 10 months.   HENT: Negative for congestion, hearing loss, nosebleeds, sore throat and tinnitus.    Eyes: Negative for blurred vision, double vision and photophobia.   Respiratory: Negative for cough, hemoptysis and shortness of breath.    Cardiovascular: Negative for chest pain, palpitations, orthopnea, claudication and leg swelling.   Gastrointestinal: Negative for abdominal  pain, blood in stool, constipation, diarrhea, heartburn, nausea and vomiting.   Genitourinary: Negative for dysuria, flank pain, frequency, hematuria and urgency.        Nocturia   Musculoskeletal: Positive for back pain. Negative for falls, joint pain, myalgias and neck pain.   Skin: Negative for itching and rash.   Neurological: Negative for dizziness, tingling, tremors, sensory change and headaches.   Endo/Heme/Allergies: Negative for environmental allergies and polydipsia. Does not bruise/bleed easily.   Psychiatric/Behavioral: Negative for depression and memory loss. The patient is nervous/anxious (at times). The patient does not have insomnia.    no New Sx    Physical Exam :   Physical Exam   Constitutional: He is oriented to person, place, and time and well-developed, well-nourished, and in no distress. Vital signs are normal. No distress.   HENT:   Head: Normocephalic and atraumatic.   Right Ear: External ear normal.   Left Ear: External ear normal.   Nose: Nose normal.   Mouth/Throat: Oropharynx is clear and moist. No oropharyngeal exudate.   Eyes: Pupils are equal, round, and reactive to light. Conjunctivae, EOM and lids are normal. Lids are everted and swept, no foreign bodies found. Right eye exhibits no discharge. Left eye exhibits no discharge. No scleral icterus.   Neck: Trachea normal, normal range of motion, full passive range of motion without pain and phonation normal. Neck supple. Normal carotid pulses, no hepatojugular reflux and no JVD present. Carotid bruit is not present. No tracheal deviation present. No thyroid mass and no thyromegaly present.   Cardiovascular: Normal rate, regular rhythm, normal heart sounds, intact distal pulses and normal pulses. PMI is not displaced. Exam reveals no gallop and no friction rub.   No murmur heard.  Pulmonary/Chest: Effort normal and breath sounds normal. No stridor. No apnea. No respiratory distress. He has no wheezes. He has no rales. He exhibits no  tenderness.   Abdominal: Soft. Normal appearance, normal aorta and bowel sounds are normal. He exhibits no distension and no mass. There is no hepatosplenomegaly. There is no tenderness. There is no rebound, no guarding and no CVA tenderness. No hernia.   Musculoskeletal: Normal range of motion. He exhibits edema (Trace edema legs). He exhibits no tenderness or deformity.   Lymphadenopathy:        Head (right side): No submental, no submandibular, no tonsillar, no preauricular, no posterior auricular and no occipital adenopathy present.        Head (left side): No submental, no submandibular, no tonsillar, no preauricular, no posterior auricular and no occipital adenopathy present.     He has no cervical adenopathy.     He has no axillary adenopathy.        Right: No inguinal, no supraclavicular and no epitrochlear adenopathy present.        Left: No inguinal, no supraclavicular and no epitrochlear adenopathy present.   Neurological: He is alert and oriented to person, place, and time. He has normal motor skills, normal sensation, normal strength, normal reflexes and intact cranial nerves. No cranial nerve deficit. He exhibits normal muscle tone. Gait normal. Coordination normal. GCS score is 15.   Skin: Skin is warm, dry and intact. No rash noted. He is not diaphoretic. No cyanosis or erythema. No pallor. Nails show no clubbing.   Psychiatric: Mood, memory, affect and judgment normal.   Nursing note and vitals reviewed.  No New finding    Labs & Imaging : 11/21/18 :  NFBS 237; cr 1.4l eGFR 52; ca 8.4; bili 1.3; ;iver enzymes marino;/  WBC 4,090.  hgb 8.2; hct 29.9; MCV 92. Platelets 54,000 ANC 3,200  07/15/19 : Nfbs 111; CR.1.08; cA 9.5. BILi 0.8. . AST 37;  WBC 2,100.  ANC 1,296. . Platelets 47,000 Hgb 13.6; hct 41.2. MCV 86.7  08/12/19 ; hepB surface antigen negative. HCV antibody Grey zone.  WBC 2,260; Hgb 13.5; hct 43.8; MCV 92. Platelets 55,000 ANC 1,400.  .  NFBS 131; Cr.1.1. Ca 9.5. Bili  0.5 ;  AST 45. ALT 46.  Reviewed Peripheral smear. unremarkable      Dx :  Thrombocytopenia; neutropenia. Elevated liver enzymes. Splenomegaly      Assessment & Plan: Reviewed with patient & Spouse.   Recheck HCV antibody. Get JAK2  V617 F mutation.  If these are non diagnostic get bone marrow studies followup with results.

## 2019-09-11 LAB — HCV AB SERPL QL IA: ABNORMAL

## 2019-10-29 ENCOUNTER — TELEPHONE (OUTPATIENT)
Dept: HEMATOLOGY/ONCOLOGY | Facility: HOSPITAL | Age: 64
End: 2019-10-29

## 2025-01-29 NOTE — DISCHARGE INSTRUCTIONS
There is a referral to the wound Care Clinic.  If you do not get a phone call from this clinic, make appointment with your primary care physician for further evaluation and possible referral to wound care.  
xray/awaiting radiology